# Patient Record
Sex: FEMALE | Race: WHITE | NOT HISPANIC OR LATINO | URBAN - METROPOLITAN AREA
[De-identification: names, ages, dates, MRNs, and addresses within clinical notes are randomized per-mention and may not be internally consistent; named-entity substitution may affect disease eponyms.]

---

## 2017-04-04 ENCOUNTER — OUTPATIENT (OUTPATIENT)
Dept: OUTPATIENT SERVICES | Facility: HOSPITAL | Age: 82
LOS: 1 days | End: 2017-04-04
Payer: MEDICARE

## 2017-04-04 VITALS
DIASTOLIC BLOOD PRESSURE: 70 MMHG | RESPIRATION RATE: 18 BRPM | WEIGHT: 134.48 LBS | OXYGEN SATURATION: 98 % | TEMPERATURE: 98 F | SYSTOLIC BLOOD PRESSURE: 172 MMHG | HEART RATE: 69 BPM | HEIGHT: 61 IN

## 2017-04-04 VITALS — DIASTOLIC BLOOD PRESSURE: 78 MMHG | SYSTOLIC BLOOD PRESSURE: 167 MMHG

## 2017-04-04 DIAGNOSIS — Z98.890 OTHER SPECIFIED POSTPROCEDURAL STATES: Chronic | ICD-10-CM

## 2017-04-04 DIAGNOSIS — Z98.42 CATARACT EXTRACTION STATUS, LEFT EYE: Chronic | ICD-10-CM

## 2017-04-04 DIAGNOSIS — Z90.710 ACQUIRED ABSENCE OF BOTH CERVIX AND UTERUS: Chronic | ICD-10-CM

## 2017-04-04 DIAGNOSIS — Z01.810 ENCOUNTER FOR PREPROCEDURAL CARDIOVASCULAR EXAMINATION: ICD-10-CM

## 2017-04-04 LAB
ANION GAP SERPL CALC-SCNC: 13 MMOL/L — SIGNIFICANT CHANGE UP (ref 5–17)
APTT BLD: 32.9 SEC — SIGNIFICANT CHANGE UP (ref 27.5–37.4)
BASOPHILS # BLD AUTO: 0 K/UL — SIGNIFICANT CHANGE UP (ref 0–0.2)
BASOPHILS NFR BLD AUTO: 0.3 % — SIGNIFICANT CHANGE UP (ref 0–2)
BUN SERPL-MCNC: 23 MG/DL — HIGH (ref 8–20)
CALCIUM SERPL-MCNC: 9.4 MG/DL — SIGNIFICANT CHANGE UP (ref 8.6–10.2)
CHLORIDE SERPL-SCNC: 102 MMOL/L — SIGNIFICANT CHANGE UP (ref 98–107)
CO2 SERPL-SCNC: 23 MMOL/L — SIGNIFICANT CHANGE UP (ref 22–29)
CREAT SERPL-MCNC: 0.94 MG/DL — SIGNIFICANT CHANGE UP (ref 0.5–1.3)
EOSINOPHIL # BLD AUTO: 0.3 K/UL — SIGNIFICANT CHANGE UP (ref 0–0.5)
EOSINOPHIL NFR BLD AUTO: 5.6 % — SIGNIFICANT CHANGE UP (ref 0–6)
GLUCOSE SERPL-MCNC: 101 MG/DL — SIGNIFICANT CHANGE UP (ref 70–115)
HCT VFR BLD CALC: 30.6 % — LOW (ref 37–47)
HGB BLD-MCNC: 9.7 G/DL — LOW (ref 12–16)
INR BLD: 2.21 RATIO — HIGH (ref 0.88–1.16)
LYMPHOCYTES # BLD AUTO: 1.8 K/UL — SIGNIFICANT CHANGE UP (ref 1–4.8)
LYMPHOCYTES # BLD AUTO: 29.3 % — SIGNIFICANT CHANGE UP (ref 20–55)
MAGNESIUM SERPL-MCNC: 2 MG/DL — SIGNIFICANT CHANGE UP (ref 1.8–2.5)
MCHC RBC-ENTMCNC: 28.1 PG — SIGNIFICANT CHANGE UP (ref 27–31)
MCHC RBC-ENTMCNC: 31.7 G/DL — LOW (ref 32–36)
MCV RBC AUTO: 88.7 FL — SIGNIFICANT CHANGE UP (ref 81–99)
MONOCYTES # BLD AUTO: 0.8 K/UL — SIGNIFICANT CHANGE UP (ref 0–0.8)
MONOCYTES NFR BLD AUTO: 13.2 % — HIGH (ref 3–10)
NEUTROPHILS # BLD AUTO: 3.1 K/UL — SIGNIFICANT CHANGE UP (ref 1.8–8)
NEUTROPHILS NFR BLD AUTO: 51.4 % — SIGNIFICANT CHANGE UP (ref 37–73)
PLATELET # BLD AUTO: 256 K/UL — SIGNIFICANT CHANGE UP (ref 150–400)
POTASSIUM SERPL-MCNC: 4.5 MMOL/L — SIGNIFICANT CHANGE UP (ref 3.5–5.3)
POTASSIUM SERPL-SCNC: 4.5 MMOL/L — SIGNIFICANT CHANGE UP (ref 3.5–5.3)
PROTHROM AB SERPL-ACNC: 24.7 SEC — HIGH (ref 9.8–12.7)
RBC # BLD: 3.45 M/UL — LOW (ref 4.4–5.2)
RBC # FLD: 14.3 % — SIGNIFICANT CHANGE UP (ref 11–15.6)
SODIUM SERPL-SCNC: 138 MMOL/L — SIGNIFICANT CHANGE UP (ref 135–145)
WBC # BLD: 6 K/UL — SIGNIFICANT CHANGE UP (ref 4.8–10.8)
WBC # FLD AUTO: 6 K/UL — SIGNIFICANT CHANGE UP (ref 4.8–10.8)

## 2017-04-04 PROCEDURE — 85027 COMPLETE CBC AUTOMATED: CPT

## 2017-04-04 PROCEDURE — 83735 ASSAY OF MAGNESIUM: CPT

## 2017-04-04 PROCEDURE — 93005 ELECTROCARDIOGRAM TRACING: CPT

## 2017-04-04 PROCEDURE — 85730 THROMBOPLASTIN TIME PARTIAL: CPT

## 2017-04-04 PROCEDURE — 85610 PROTHROMBIN TIME: CPT

## 2017-04-04 PROCEDURE — G0463: CPT

## 2017-04-04 PROCEDURE — 80048 BASIC METABOLIC PNL TOTAL CA: CPT

## 2017-04-04 PROCEDURE — 93010 ELECTROCARDIOGRAM REPORT: CPT

## 2017-04-04 NOTE — ASU PATIENT PROFILE, ADULT - PSH
H/O foot surgery    H/O: hysterectomy    History of carpal tunnel release    History of left cataract surgery

## 2017-04-04 NOTE — H&P PST ADULT - HISTORY OF PRESENT ILLNESS
89 y/o female with c/o worsening back discomfort radiating to her right arm accompanied by mild nausea, diaphoresis, dyspnea and pre-syncope. Symptoms have been occurring for the past few months and are usually relieved with rest after 2-3 ,minutes 89 y/o female with c/o worsening back discomfort radiating to her right arm accompanied by mild nausea, diaphoresis, dyspnea and pre-syncope. Symptoms have been occurring for the past few months and are usually relieved with rest after 2-3 minutes CCS 3. 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 She had a negative stress test 1 week ago as per Dr Ozuna's note (awaiting faxed report). She is referred for cardiac catheterization.

## 2017-04-04 NOTE — H&P PST ADULT - ASSESSMENT
87 y/o female with c/o worsening back discomfort radiating to her right arm accompanied by mild nausea, diaphoresis, dyspnea and pre-syncope. Symptoms have been occurring for the past few months and are usually relieved with rest after 2-3 minutes CCS 3. 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 She had a negative nuclear stress test 1 week ago as per Dr Ozuna's note (awaiting faxed report). She is referred for cardiac catheterization.      Plan:   - Elective LHC w/ Dr Billingsley on 4.7.17  - Pt instructed to hold warfarin for 3 days, repeat INR morning of procedure

## 2017-04-04 NOTE — H&P PST ADULT - PMH
Angina pectoris    ASHD (arteriosclerotic heart disease)    DVT (deep venous thrombosis)    HLD (hyperlipidemia)    HTN (hypertension)    Pulmonary embolus

## 2017-04-04 NOTE — ASU PATIENT PROFILE, ADULT - PMH
Angina pectoris    ASHD (arteriosclerotic heart disease)    HLD (hyperlipidemia)    HTN (hypertension)    Pulmonary embolus

## 2017-04-05 DIAGNOSIS — Z01.810 ENCOUNTER FOR PREPROCEDURAL CARDIOVASCULAR EXAMINATION: ICD-10-CM

## 2017-04-05 DIAGNOSIS — I25.10 ATHEROSCLEROTIC HEART DISEASE OF NATIVE CORONARY ARTERY WITHOUT ANGINA PECTORIS: ICD-10-CM

## 2017-04-07 ENCOUNTER — INPATIENT (INPATIENT)
Facility: HOSPITAL | Age: 82
LOS: 8 days | Discharge: HOME | DRG: 234 | End: 2017-04-16
Attending: THORACIC SURGERY (CARDIOTHORACIC VASCULAR SURGERY) | Admitting: INTERNAL MEDICINE
Payer: MEDICARE

## 2017-04-07 VITALS
DIASTOLIC BLOOD PRESSURE: 69 MMHG | OXYGEN SATURATION: 99 % | HEART RATE: 68 BPM | RESPIRATION RATE: 16 BRPM | SYSTOLIC BLOOD PRESSURE: 160 MMHG | TEMPERATURE: 98 F

## 2017-04-07 DIAGNOSIS — I25.10 ATHEROSCLEROTIC HEART DISEASE OF NATIVE CORONARY ARTERY WITHOUT ANGINA PECTORIS: ICD-10-CM

## 2017-04-07 DIAGNOSIS — I10 ESSENTIAL (PRIMARY) HYPERTENSION: ICD-10-CM

## 2017-04-07 DIAGNOSIS — R07.89 OTHER CHEST PAIN: ICD-10-CM

## 2017-04-07 DIAGNOSIS — Z90.710 ACQUIRED ABSENCE OF BOTH CERVIX AND UTERUS: Chronic | ICD-10-CM

## 2017-04-07 DIAGNOSIS — E78.00 PURE HYPERCHOLESTEROLEMIA, UNSPECIFIED: ICD-10-CM

## 2017-04-07 DIAGNOSIS — Z01.810 ENCOUNTER FOR PREPROCEDURAL CARDIOVASCULAR EXAMINATION: ICD-10-CM

## 2017-04-07 DIAGNOSIS — Z98.890 OTHER SPECIFIED POSTPROCEDURAL STATES: Chronic | ICD-10-CM

## 2017-04-07 DIAGNOSIS — I26.99 OTHER PULMONARY EMBOLISM WITHOUT ACUTE COR PULMONALE: ICD-10-CM

## 2017-04-07 DIAGNOSIS — Z98.42 CATARACT EXTRACTION STATUS, LEFT EYE: Chronic | ICD-10-CM

## 2017-04-07 LAB
ALBUMIN SERPL ELPH-MCNC: 3.9 G/DL — SIGNIFICANT CHANGE UP (ref 3.3–5.2)
ALP SERPL-CCNC: 64 U/L — SIGNIFICANT CHANGE UP (ref 40–120)
ALT FLD-CCNC: 14 U/L — SIGNIFICANT CHANGE UP
AMYLASE P1 CFR SERPL: 92 U/L — SIGNIFICANT CHANGE UP (ref 36–128)
ANION GAP SERPL CALC-SCNC: 16 MMOL/L — SIGNIFICANT CHANGE UP (ref 5–17)
APTT BLD: 26.4 SEC — LOW (ref 27.5–37.4)
AST SERPL-CCNC: 27 U/L — SIGNIFICANT CHANGE UP
BILIRUB SERPL-MCNC: 0.4 MG/DL — SIGNIFICANT CHANGE UP (ref 0.4–2)
BLD GP AB SCN SERPL QL: SIGNIFICANT CHANGE UP
BUN SERPL-MCNC: 18 MG/DL — SIGNIFICANT CHANGE UP (ref 8–20)
CALCIUM SERPL-MCNC: 9.7 MG/DL — SIGNIFICANT CHANGE UP (ref 8.6–10.2)
CHLORIDE SERPL-SCNC: 106 MMOL/L — SIGNIFICANT CHANGE UP (ref 98–107)
CO2 SERPL-SCNC: 21 MMOL/L — LOW (ref 22–29)
CREAT SERPL-MCNC: 0.85 MG/DL — SIGNIFICANT CHANGE UP (ref 0.5–1.3)
GLUCOSE SERPL-MCNC: 125 MG/DL — HIGH (ref 70–115)
HBA1C BLD-MCNC: 6 % — HIGH (ref 4–5.6)
HCT VFR BLD CALC: 31.4 % — LOW (ref 37–47)
HGB BLD-MCNC: 10 G/DL — LOW (ref 12–16)
INR BLD: 1.05 RATIO — SIGNIFICANT CHANGE UP (ref 0.88–1.16)
MAGNESIUM SERPL-MCNC: 1.9 MG/DL — SIGNIFICANT CHANGE UP (ref 1.8–2.5)
MCHC RBC-ENTMCNC: 27.9 PG — SIGNIFICANT CHANGE UP (ref 27–31)
MCHC RBC-ENTMCNC: 31.8 G/DL — LOW (ref 32–36)
MCV RBC AUTO: 87.7 FL — SIGNIFICANT CHANGE UP (ref 81–99)
NT-PROBNP SERPL-SCNC: 296 PG/ML — SIGNIFICANT CHANGE UP (ref 0–300)
PHOSPHATE SERPL-MCNC: 3.7 MG/DL — SIGNIFICANT CHANGE UP (ref 2.4–4.7)
PLATELET # BLD AUTO: 246 K/UL — SIGNIFICANT CHANGE UP (ref 150–400)
POTASSIUM SERPL-MCNC: 4.2 MMOL/L — SIGNIFICANT CHANGE UP (ref 3.5–5.3)
POTASSIUM SERPL-SCNC: 4.2 MMOL/L — SIGNIFICANT CHANGE UP (ref 3.5–5.3)
PREALB SERPL-MCNC: 27 MG/DL — SIGNIFICANT CHANGE UP (ref 18–38)
PROT SERPL-MCNC: 7.9 G/DL — SIGNIFICANT CHANGE UP (ref 6.6–8.7)
PROTHROM AB SERPL-ACNC: 11.6 SEC — SIGNIFICANT CHANGE UP (ref 9.8–12.7)
RBC # BLD: 3.58 M/UL — LOW (ref 4.4–5.2)
RBC # FLD: 14.3 % — SIGNIFICANT CHANGE UP (ref 11–15.6)
SODIUM SERPL-SCNC: 143 MMOL/L — SIGNIFICANT CHANGE UP (ref 135–145)
T3 SERPL-MCNC: 90 NG/DL — SIGNIFICANT CHANGE UP (ref 80–200)
T4 AB SER-ACNC: 7.2 UG/DL — SIGNIFICANT CHANGE UP (ref 4.5–12)
TSH SERPL-MCNC: 4.04 UIU/ML — SIGNIFICANT CHANGE UP (ref 0.27–4.2)
TYPE + AB SCN PNL BLD: SIGNIFICANT CHANGE UP
WBC # BLD: 6.1 K/UL — SIGNIFICANT CHANGE UP (ref 4.8–10.8)
WBC # FLD AUTO: 6.1 K/UL — SIGNIFICANT CHANGE UP (ref 4.8–10.8)

## 2017-04-07 PROCEDURE — 93454 CORONARY ARTERY ANGIO S&I: CPT | Mod: 26

## 2017-04-07 PROCEDURE — 93306 TTE W/DOPPLER COMPLETE: CPT | Mod: 26

## 2017-04-07 RX ORDER — HEPARIN SODIUM 5000 [USP'U]/ML
2500 INJECTION INTRAVENOUS; SUBCUTANEOUS EVERY 6 HOURS
Qty: 0 | Refills: 0 | Status: DISCONTINUED | OUTPATIENT
Start: 2017-04-07 | End: 2017-04-07

## 2017-04-07 RX ORDER — CHLORHEXIDINE GLUCONATE 213 G/1000ML
1 SOLUTION TOPICAL
Qty: 0 | Refills: 0 | Status: DISCONTINUED | OUTPATIENT
Start: 2017-04-08 | End: 2017-04-10

## 2017-04-07 RX ORDER — ATORVASTATIN CALCIUM 80 MG/1
10 TABLET, FILM COATED ORAL AT BEDTIME
Qty: 0 | Refills: 0 | Status: DISCONTINUED | OUTPATIENT
Start: 2017-04-07 | End: 2017-04-08

## 2017-04-07 RX ORDER — HEPARIN SODIUM 5000 [USP'U]/ML
1000 INJECTION INTRAVENOUS; SUBCUTANEOUS
Qty: 25000 | Refills: 0 | Status: DISCONTINUED | OUTPATIENT
Start: 2017-04-07 | End: 2017-04-10

## 2017-04-07 RX ORDER — MAGNESIUM OXIDE 400 MG ORAL TABLET 241.3 MG
400 TABLET ORAL ONCE
Qty: 0 | Refills: 0 | Status: COMPLETED | OUTPATIENT
Start: 2017-04-07 | End: 2017-04-07

## 2017-04-07 RX ORDER — FENTANYL CITRATE 50 UG/ML
25 INJECTION INTRAVENOUS ONCE
Qty: 0 | Refills: 0 | Status: DISCONTINUED | OUTPATIENT
Start: 2017-04-07 | End: 2017-04-07

## 2017-04-07 RX ORDER — CHLORHEXIDINE GLUCONATE 213 G/1000ML
15 SOLUTION TOPICAL
Qty: 0 | Refills: 0 | Status: DISCONTINUED | OUTPATIENT
Start: 2017-04-07 | End: 2017-04-10

## 2017-04-07 RX ORDER — HEPARIN SODIUM 5000 [USP'U]/ML
INJECTION INTRAVENOUS; SUBCUTANEOUS
Qty: 25000 | Refills: 0 | Status: DISCONTINUED | OUTPATIENT
Start: 2017-04-07 | End: 2017-04-07

## 2017-04-07 RX ORDER — MUPIROCIN 20 MG/G
1 OINTMENT TOPICAL
Qty: 0 | Refills: 0 | Status: DISCONTINUED | OUTPATIENT
Start: 2017-04-07 | End: 2017-04-10

## 2017-04-07 RX ORDER — INSULIN LISPRO 100/ML
VIAL (ML) SUBCUTANEOUS
Qty: 0 | Refills: 0 | Status: DISCONTINUED | OUTPATIENT
Start: 2017-04-07 | End: 2017-04-08

## 2017-04-07 RX ORDER — HYDRALAZINE HCL 50 MG
5 TABLET ORAL ONCE
Qty: 0 | Refills: 0 | Status: COMPLETED | OUTPATIENT
Start: 2017-04-07 | End: 2017-04-07

## 2017-04-07 RX ORDER — ASPIRIN/CALCIUM CARB/MAGNESIUM 324 MG
81 TABLET ORAL DAILY
Qty: 0 | Refills: 0 | Status: DISCONTINUED | OUTPATIENT
Start: 2017-04-08 | End: 2017-04-10

## 2017-04-07 RX ORDER — HEPARIN SODIUM 5000 [USP'U]/ML
5000 INJECTION INTRAVENOUS; SUBCUTANEOUS EVERY 6 HOURS
Qty: 0 | Refills: 0 | Status: DISCONTINUED | OUTPATIENT
Start: 2017-04-07 | End: 2017-04-07

## 2017-04-07 RX ORDER — METOPROLOL TARTRATE 50 MG
100 TABLET ORAL DAILY
Qty: 0 | Refills: 0 | Status: DISCONTINUED | OUTPATIENT
Start: 2017-04-07 | End: 2017-04-08

## 2017-04-07 RX ORDER — ASPIRIN/CALCIUM CARB/MAGNESIUM 324 MG
325 TABLET ORAL ONCE
Qty: 0 | Refills: 0 | Status: COMPLETED | OUTPATIENT
Start: 2017-04-07 | End: 2017-04-07

## 2017-04-07 RX ADMIN — FENTANYL CITRATE 25 MICROGRAM(S): 50 INJECTION INTRAVENOUS at 14:02

## 2017-04-07 RX ADMIN — MAGNESIUM OXIDE 400 MG ORAL TABLET 400 MILLIGRAM(S): 241.3 TABLET ORAL at 23:08

## 2017-04-07 RX ADMIN — MUPIROCIN 1 APPLICATION(S): 20 OINTMENT TOPICAL at 18:04

## 2017-04-07 RX ADMIN — HEPARIN SODIUM 10 UNIT(S)/HR: 5000 INJECTION INTRAVENOUS; SUBCUTANEOUS at 18:02

## 2017-04-07 RX ADMIN — ATORVASTATIN CALCIUM 10 MILLIGRAM(S): 80 TABLET, FILM COATED ORAL at 23:08

## 2017-04-07 RX ADMIN — Medication 5 MILLIGRAM(S): at 11:50

## 2017-04-07 RX ADMIN — CHLORHEXIDINE GLUCONATE 15 MILLILITER(S): 213 SOLUTION TOPICAL at 18:03

## 2017-04-07 RX ADMIN — Medication 325 MILLIGRAM(S): at 09:35

## 2017-04-07 RX ADMIN — FENTANYL CITRATE 25 MICROGRAM(S): 50 INJECTION INTRAVENOUS at 13:00

## 2017-04-07 NOTE — CONSULT NOTE ADULT - SUBJECTIVE AND OBJECTIVE BOX
CC: " i had this weird feeling in my back moving into my arm"     Surgeon: Dr. Griffin     Consult requesting by: Dr. Billingsley     HISTORY OF PRESENT ILLNESS:  88y Female presenting with complaints of a "weird feeling" up her back into her right arm associated with exertion.  Patient denies chest pain     PAST MEDICAL & SURGICAL HISTORY:  DVT (deep venous thrombosis)  Pulmonary embolus  ASHD (arteriosclerotic heart disease)  Angina pectoris  HLD (hyperlipidemia)  HTN (hypertension)  H/O foot surgery  H/O: hysterectomy  History of left cataract surgery  History of carpal tunnel release      MEDICATIONS  (STANDING):  atorvastatin 10milliGRAM(s) Oral at bedtime  metoprolol 100milliGRAM(s) Oral daily  chlorhexidine 0.12% Liquid 15milliLiter(s) Swish and Spit two times a day  mupirocin 2% Ointment 1Application(s) Topical two times a day  heparin  Infusion 1000Unit(s)/Hr IV Continuous <Continuous>  insulin lispro (HumaLOG) corrective regimen sliding scale  SubCutaneous Before meals and at bedtime    MEDICATIONS  (PRN):    Antiplatelet therapy:  Coumadin                 Last dose/amt: 5MG 4/3/2017    Allergies   No Known Allergies    Intolerances        SOCIAL HISTORY:  Smoker: [ ] Yes  [ ] No        PACK YEARS:                         WHEN QUIT?  ETOH use: [ ] Yes  [ ] No              FREQUENCY / QUANTITY:  Ilicit Drug use:  [ ] Yes  [ ] No  Occupation:  Live with:  Assisted device use:    FAMILY HISTORY:      Review of Systems  CONSTITUTIONAL:  Fevers[ ] chills[ ] sweats[ ] fatigue[ ] weight loss[ ] weight gain [ ]                                     NEGATIVE [ ]   NEURO:  parathesias[ ] seizures [ ]  syncope [ ]  confusion [ ]                                                                                NEGATIVE[ ]   EYES: glasses[ ]  blurry vision[ ]  discharge[ ] pain[ ] glaucoma [ ]                                                                          NEGATIVE[ ]   ENMT:  difficulty hearing [ ]  vertigo[ ]  dysphagia[ ] epistaxis[ ] recent dental work [ ]                                    NEGATIVE[ ]   CV:  chest pain[ ] palpitations[ ] LAURENT [ ] diaphoresis [ ]                                                                                           NEGATIVE[ ]   RESPIRATORY:  wheezing[ ] SOB[ ] cough [ ] sputum[ ] hemoptysis[ ]                                                                  NEGATIVE[ ]   GI:  nausea[ ]  vommiting [ ]  diarrhea[ ] constipation [ ] melena [ ]                                                                      NEGATIVE[ ]   : hematuria[ ]  dysuria[ ] urgency[ ] incontinence[ ]                                                                                            NEGATIVE[ ]   MUSKULOSKELETAL:  arthritis[ ]  joint swelling [ ] muscle weakness [ ]                                                                NEGATIVE[ ]   SKIN/BREAST:  rash[ ] itching [ ]  hair loss[ ] masses[ ]                                                                                              NEGATIVE[ ]   PSYCH:  dementia [ ] depresion [ ] anxiety[ ]                                                                                                               NEGATIVE[ ]   HEME/LYMPH:  bruises easily[ ] enlarged lymph nodes[ ] tender lymph nodes[ ]                                               NEGATIVE[ ]   ENDOCRINE:  cold intolerance[ ] heat intolerance[ ] polydipsia[ ]                                                                          NEGATIVE[ ]     PHYSICAL EXAM  Vital Signs Last 24 Hrs  T(C): 36.6, Max: 36.6 (04-07 @ 10:06)  T(F): 97.9, Max: 97.9 (04-07 @ 10:06)  HR: 70 (43 - 78)  BP: 138/68 (138/68 - 170/68)  BP(mean): --  RR: 16 (16 - 16)  SpO2: 99% (96% - 100%)    CONSTITUTIONAL:                                                                          WNL[ ]   Neuro: WNL[ ] Normal exam oriented to person/place & time with no focal motor or sensory  deficits. Other __________                    Eyes: WNL[ ]   Normal exam of conjunctiva & lids, pupils equally reactive. Other______________________________     ENT: WNL[ ]    Normal exam of nasal/oral mucosa with absence of cyanosis. Other_____________________________  Neck: WNL[ ]  Normal exam of jugular veins, trachea & thyroid. Other_________________________________________  Chest: WNL[ ] Normal lung exam with good air movement absence of wheezes, rales, or rhonchi: Other_________________________________________                                                                                CV:  Auscultation: normal [ ] S3[ ] S4[ ] Irregular [ ] Rub[ ] Clicks[ ]    Murmurs none:[ ]systolic [ ]  diastolic [ ] holosystolic [ ]  Carotids: No Bruits[ ] Other____________ Abdominal Aorta: normal [ ] nonpalpable[ ]Other___________                                                                                      GI: WNL[ ] Normal exam of abdomen, liver & spleen with no noted masses or tenderness. Other______________________                                                                                                        Extremities: WNL[ ] Normal no evidence of cyanosis or deformity Edema: none[ ]trace[ ]1+[ ]2+[ ]3+[ ]4+[ ]  Lower Extremity Pulses: Right[ ] Left[ ]Varicosities[ ]  SKIN :WNL[ ] Normal exam to inspection & palation. Other:____________________                                                          LABS:          PT/INR - ( 07 Apr 2017 09:40 )   PT: 11.6 sec;   INR: 1.05 ratio                         Cardiac Cath:    TTE / MERARY:      Assessment:          Plan: CC: " i had this weird feeling in my back moving into my arm"     Surgeon: Dr. Griffin     Consult requesting by: Dr. Billingsley     HISTORY OF PRESENT ILLNESS:  88y Female presenting with complaints of a "weird feeling" up her back into her right arm associated with exertion, mild nausea, dyspnea, and pre syncopable symptoms.  Patient denies chest pain during episode and at this time.      PAST MEDICAL & SURGICAL HISTORY:  DVT (deep venous thrombosis)  Pulmonary embolus  ASHD (arteriosclerotic heart disease)  Angina pectoris  HLD (hyperlipidemia)  HTN (hypertension)  H/O foot surgery  H/O: hysterectomy  History of left cataract surgery  History of carpal tunnel release      MEDICATIONS  (STANDING):  atorvastatin 10milliGRAM(s) Oral at bedtime  metoprolol 100milliGRAM(s) Oral daily  chlorhexidine 0.12% Liquid 15milliLiter(s) Swish and Spit two times a day  mupirocin 2% Ointment 1Application(s) Topical two times a day  heparin  Infusion 1000Unit(s)/Hr IV Continuous <Continuous>  insulin lispro (HumaLOG) corrective regimen sliding scale  SubCutaneous Before meals and at bedtime    MEDICATIONS  (PRN):    Antiplatelet therapy:  Coumadin                 Last dose/amt: 5MG 4/3/2017    Allergies   No Known Allergies    Intolerances        SOCIAL HISTORY:  Smoker: NO       PACK YEARS:                         WHEN QUIT?  ETOH use: NO          FREQUENCY / QUANTITY:  Ilicit Drug use:  NO  Occupation: retired   Live with: alone, family lives close by   Assisted device use: None     FAMILY HISTORY:      Review of Systems  CONSTITUTIONAL:  Fevers[ ] chills[ ] sweats[ ] fatigue[ ] weight loss[ ] weight gain [ ]                                     NEGATIVE [ ]   NEURO:  parathesias[ ] seizures [ ]  syncope [ ]  confusion [ ]                                                                                NEGATIVE[ ]   EYES: glasses[ ]  blurry vision[ ]  discharge[ ] pain[ ] glaucoma [ ]                                                                          NEGATIVE[ ]   ENMT:  difficulty hearing [ ]  vertigo[ ]  dysphagia[ ] epistaxis[ ] recent dental work [ ]                                    NEGATIVE[ ]   CV:  chest pain[ ] palpitations[ ] LAURENT [yes ] diaphoresis [ yes]                                                                                           NEGATIVE[ ]   RESPIRATORY:  wheezing[ ] SOB[yes ] cough [ ] sputum[ ] hemoptysis[ ]                                                                  NEGATIVE[ ]   GI:  nausea[yes ]  vommiting [ ]  diarrhea[ ] constipation [ ] melena [ ]                                                                      NEGATIVE[ ]   : hematuria[ ]  dysuria[ ] urgency[ ] incontinence[ ]                                                                                            NEGATIVE[ ]   MUSKULOSKELETAL:  arthritis[yes ]  joint swelling [ ] muscle weakness [ ]                                                                NEGATIVE[ ]   SKIN/BREAST:  rash[ ] itching [ ]  hair loss[ ] masses[ ]                                                                                              NEGATIVE[ ]   PSYCH:  dementia [ ] depresion [ ] anxiety[ ]                                                                                                               NEGATIVE[ ]   HEME/LYMPH:  bruises easily[ ] enlarged lymph nodes[ ] tender lymph nodes[ ]                                               NEGATIVE[ ]   ENDOCRINE:  cold intolerance[ ] heat intolerance[ ] polydipsia[ ]                                                                          NEGATIVE[ ]     PHYSICAL EXAM  Vital Signs Last 24 Hrs  T(C): 36.6, Max: 36.6 (04-07 @ 10:06)  T(F): 97.9, Max: 97.9 (04-07 @ 10:06)  HR: 70 (43 - 78)  BP: 138/68 (138/68 - 170/68)  BP(mean): --  RR: 16 (16 - 16)  SpO2: 99% (96% - 100%)    CONSTITUTIONAL:                                                                           Neuro: alert and oriented x 3, without focal defects          Eyes: PERRLA  ENT: neck supple, without JVD   Chest: +S1S2 RRR without murmur                                                                   Carotids: No Bruits                                                                                  GI: soft nontender wihtout guarding                                                                                                   Extremities: +pulses, wihtout edema   SKIN :Normal exam to inspection & palation.                                                           LABS:          PT/INR - ( 07 Apr 2017 09:40 )   PT: 11.6 sec;   INR: 1.05 ratio      Cardiac Cath:  DIAGNOSTIC IMPRESSIONS: Severe LM 70% and multivessel disease including  proximal LAD 75% stenosis and proximal CX 95% stenosis  DIAGNOSTIC RECOMMENDATIONS: Evaulation for CABG to LAD, diagonal , OM,  LPL/PDA    TTE / MERARY:  PENDING

## 2017-04-07 NOTE — CONSULT NOTE ADULT - ASSESSMENT
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

## 2017-04-07 NOTE — PROGRESS NOTE ADULT - SUBJECTIVE AND OBJECTIVE BOX
Cardiology NP    88 year old female with c/o chest/back discomfort.  Normal stress test. For cardiac cath    No change in medical history since PST.

## 2017-04-07 NOTE — PROGRESS NOTE ADULT - SUBJECTIVE AND OBJECTIVE BOX
Nurse Practitioner Progress note:     INTERVAL HISTORY: 88 year old female with c/o chest/back pain.  Pt was symptomatic prior to cath.      TELEMETRY: NSR    T(C): 36.6, Max: 36.6 (04-07 @ 10:06)  HR: 66 (43 - 74)  BP: 164/69 (158/74 - 170/68)  RR: 16 (16 - 16)  SpO2: 98% (96% - 100%)      PHYSICAL EXAM:  Appearance: Normal	  Cardiovascular: Normal S1 S2, No JVD, No murmurs, No edema  Respiratory: Lungs clear to auscultation	  Neurologic: Non-focal, A&O X3.  No neuro deficits  Procedure Site: R groin sheath removed.  Manual compression held for 15 minutes to obtain/maintain hemostasis.  Site benign. No bleeding/hematoma/ecchymosis.  + palp pedal pulse      PROCEDURE RESULTS: S/P LHC which revealed TVD: LM 70%, LAD 75%, Cx 95%.  Referred for CABG    ASSESSMENT/PLAN: 	  -Transfer to CTICU  -Groin precautions  -Bedrest   -Heparin gtt to be initiated at 1800  -B/P control  -Pre op work up  -Please call with questions/concerns

## 2017-04-07 NOTE — PROGRESS NOTE ADULT - SUBJECTIVE AND OBJECTIVE BOX
Cardiology NP    Prior to cardiac cath pt ambulated to BR.  Upon return pt diaphoretic with c/o  R arm pain and HR of 43bpm, SOB and  B/P 170/68.  Symptoms lasted approx 1 minute.  Taken emergently to cath lab.

## 2017-04-08 DIAGNOSIS — E78.5 HYPERLIPIDEMIA, UNSPECIFIED: ICD-10-CM

## 2017-04-08 DIAGNOSIS — I27.82 CHRONIC PULMONARY EMBOLISM: ICD-10-CM

## 2017-04-08 LAB
ABO RH CONFIRMATION: SIGNIFICANT CHANGE UP
ANION GAP SERPL CALC-SCNC: 13 MMOL/L — SIGNIFICANT CHANGE UP (ref 5–17)
APPEARANCE UR: CLEAR — SIGNIFICANT CHANGE UP
APTT BLD: 119.7 SEC — HIGH (ref 27.5–37.4)
APTT BLD: 127.1 SEC — CRITICAL HIGH (ref 27.5–37.4)
APTT BLD: 68.1 SEC — HIGH (ref 27.5–37.4)
APTT BLD: 99.2 SEC — HIGH (ref 27.5–37.4)
BILIRUB UR-MCNC: NEGATIVE — SIGNIFICANT CHANGE UP
BUN SERPL-MCNC: 18 MG/DL — SIGNIFICANT CHANGE UP (ref 8–20)
CALCIUM SERPL-MCNC: 9.6 MG/DL — SIGNIFICANT CHANGE UP (ref 8.6–10.2)
CHLORIDE SERPL-SCNC: 105 MMOL/L — SIGNIFICANT CHANGE UP (ref 98–107)
CO2 SERPL-SCNC: 23 MMOL/L — SIGNIFICANT CHANGE UP (ref 22–29)
COLOR SPEC: YELLOW — SIGNIFICANT CHANGE UP
CREAT SERPL-MCNC: 0.96 MG/DL — SIGNIFICANT CHANGE UP (ref 0.5–1.3)
DIFF PNL FLD: NEGATIVE — SIGNIFICANT CHANGE UP
GLUCOSE SERPL-MCNC: 112 MG/DL — SIGNIFICANT CHANGE UP (ref 70–115)
GLUCOSE UR QL: NEGATIVE MG/DL — SIGNIFICANT CHANGE UP
HCT VFR BLD CALC: 30.4 % — LOW (ref 37–47)
HGB BLD-MCNC: 9.6 G/DL — LOW (ref 12–16)
INR BLD: 1.06 RATIO — SIGNIFICANT CHANGE UP (ref 0.88–1.16)
KETONES UR-MCNC: NEGATIVE — SIGNIFICANT CHANGE UP
LEUKOCYTE ESTERASE UR-ACNC: NEGATIVE — SIGNIFICANT CHANGE UP
MAGNESIUM SERPL-MCNC: 1.9 MG/DL — SIGNIFICANT CHANGE UP (ref 1.8–2.5)
MCHC RBC-ENTMCNC: 27.4 PG — SIGNIFICANT CHANGE UP (ref 27–31)
MCHC RBC-ENTMCNC: 31.6 G/DL — LOW (ref 32–36)
MCV RBC AUTO: 86.6 FL — SIGNIFICANT CHANGE UP (ref 81–99)
MRSA PCR RESULT.: SIGNIFICANT CHANGE UP
NITRITE UR-MCNC: NEGATIVE — SIGNIFICANT CHANGE UP
PH UR: 6 — SIGNIFICANT CHANGE UP (ref 4.8–8)
PHOSPHATE SERPL-MCNC: 3.7 MG/DL — SIGNIFICANT CHANGE UP (ref 2.4–4.7)
PLATELET # BLD AUTO: 243 K/UL — SIGNIFICANT CHANGE UP (ref 150–400)
POTASSIUM SERPL-MCNC: 4.3 MMOL/L — SIGNIFICANT CHANGE UP (ref 3.5–5.3)
POTASSIUM SERPL-SCNC: 4.3 MMOL/L — SIGNIFICANT CHANGE UP (ref 3.5–5.3)
PROT UR-MCNC: NEGATIVE MG/DL — SIGNIFICANT CHANGE UP
PROTHROM AB SERPL-ACNC: 11.7 SEC — SIGNIFICANT CHANGE UP (ref 9.8–12.7)
RBC # BLD: 3.51 M/UL — LOW (ref 4.4–5.2)
RBC # FLD: 14.4 % — SIGNIFICANT CHANGE UP (ref 11–15.6)
S AUREUS DNA NOSE QL NAA+PROBE: SIGNIFICANT CHANGE UP
SODIUM SERPL-SCNC: 141 MMOL/L — SIGNIFICANT CHANGE UP (ref 135–145)
SP GR SPEC: 1.01 — SIGNIFICANT CHANGE UP (ref 1.01–1.02)
UROBILINOGEN FLD QL: NEGATIVE MG/DL — SIGNIFICANT CHANGE UP
WBC # BLD: 5.4 K/UL — SIGNIFICANT CHANGE UP (ref 4.8–10.8)
WBC # FLD AUTO: 5.4 K/UL — SIGNIFICANT CHANGE UP (ref 4.8–10.8)

## 2017-04-08 PROCEDURE — 93880 EXTRACRANIAL BILAT STUDY: CPT | Mod: 26

## 2017-04-08 PROCEDURE — 71010: CPT | Mod: 26

## 2017-04-08 RX ORDER — METOPROLOL TARTRATE 50 MG
100 TABLET ORAL DAILY
Qty: 0 | Refills: 0 | Status: DISCONTINUED | OUTPATIENT
Start: 2017-04-09 | End: 2017-04-10

## 2017-04-08 RX ORDER — MAGNESIUM SULFATE 500 MG/ML
2 VIAL (ML) INJECTION ONCE
Qty: 0 | Refills: 0 | Status: DISCONTINUED | OUTPATIENT
Start: 2017-04-08 | End: 2017-04-08

## 2017-04-08 RX ORDER — MAGNESIUM OXIDE 400 MG ORAL TABLET 241.3 MG
400 TABLET ORAL ONCE
Qty: 0 | Refills: 0 | Status: COMPLETED | OUTPATIENT
Start: 2017-04-08 | End: 2017-04-08

## 2017-04-08 RX ORDER — ATORVASTATIN CALCIUM 80 MG/1
40 TABLET, FILM COATED ORAL AT BEDTIME
Qty: 0 | Refills: 0 | Status: DISCONTINUED | OUTPATIENT
Start: 2017-04-08 | End: 2017-04-10

## 2017-04-08 RX ORDER — AMLODIPINE BESYLATE 2.5 MG/1
2.5 TABLET ORAL DAILY
Qty: 0 | Refills: 0 | Status: DISCONTINUED | OUTPATIENT
Start: 2017-04-08 | End: 2017-04-08

## 2017-04-08 RX ADMIN — Medication 81 MILLIGRAM(S): at 15:11

## 2017-04-08 RX ADMIN — HEPARIN SODIUM 8 UNIT(S)/HR: 5000 INJECTION INTRAVENOUS; SUBCUTANEOUS at 09:03

## 2017-04-08 RX ADMIN — CHLORHEXIDINE GLUCONATE 1 APPLICATION(S): 213 SOLUTION TOPICAL at 21:40

## 2017-04-08 RX ADMIN — CHLORHEXIDINE GLUCONATE 15 MILLILITER(S): 213 SOLUTION TOPICAL at 05:57

## 2017-04-08 RX ADMIN — MUPIROCIN 1 APPLICATION(S): 20 OINTMENT TOPICAL at 21:39

## 2017-04-08 RX ADMIN — AMLODIPINE BESYLATE 2.5 MILLIGRAM(S): 2.5 TABLET ORAL at 06:48

## 2017-04-08 RX ADMIN — MUPIROCIN 1 APPLICATION(S): 20 OINTMENT TOPICAL at 05:57

## 2017-04-08 RX ADMIN — MAGNESIUM OXIDE 400 MG ORAL TABLET 400 MILLIGRAM(S): 241.3 TABLET ORAL at 09:13

## 2017-04-08 RX ADMIN — Medication 100 MILLIGRAM(S): at 05:57

## 2017-04-08 RX ADMIN — HEPARIN SODIUM 6 UNIT(S)/HR: 5000 INJECTION INTRAVENOUS; SUBCUTANEOUS at 16:39

## 2017-04-08 RX ADMIN — HEPARIN SODIUM 9 UNIT(S)/HR: 5000 INJECTION INTRAVENOUS; SUBCUTANEOUS at 07:18

## 2017-04-08 RX ADMIN — CHLORHEXIDINE GLUCONATE 15 MILLILITER(S): 213 SOLUTION TOPICAL at 21:39

## 2017-04-08 NOTE — PROGRESS NOTE ADULT - SUBJECTIVE AND OBJECTIVE BOX
Subjective:  88yFemale  PMHX HTN, HLD, h/o dvt and pe on coumadin, now found to have 3vd.  NO acute complaints    Past Medical History:  Pre-operative cardiovascular examination  Other chest pain  DVT (deep venous thrombosis)  Pulmonary embolus  ASHD (arteriosclerotic heart disease)  Angina pectoris  HLD (hyperlipidemia)  HTN (hypertension)  Other pulmonary embolism without acute cor pulmonale, unspecified chronicity  Essential hypertension  Pure hypercholesterolemia  ASHD (arteriosclerotic heart disease)  H/O foot surgery  H/O: hysterectomy  History of left cataract surgery  History of carpal tunnel release        atorvastatin 10milliGRAM(s) Oral at bedtime  metoprolol 100milliGRAM(s) Oral daily  chlorhexidine 0.12% Liquid 15milliLiter(s) Swish and Spit two times a day  chlorhexidine 4% Liquid 1Application(s) Topical two times a day  mupirocin 2% Ointment 1Application(s) Topical two times a day  heparin  Infusion 1000Unit(s)/Hr IV Continuous <Continuous>  insulin lispro (HumaLOG) corrective regimen sliding scale  SubCutaneous Before meals and at bedtime  aspirin enteric coated 81milliGRAM(s) Oral daily  MEDICATIONS  (PRN):    Height (cm): 152.4 (04-07 @ 09:25)  Weight (kg): 60.8 (04-07 @ 09:25)  BMI (kg/m2): 26.2 (04-07 @ 09:25)  BSA (m2): 1.57 (04-07 @ 09:25)  Daily     Daily                               10.0   6.1   )-----------( 246      ( 07 Apr 2017 18:33 )             31.4   04-07    143  |  106  |  18.0  ----------------------------<  125<H>  4.2   |  21.0<L>  |  0.85    Ca    9.7      07 Apr 2017 18:33  Phos  3.7     04-07  Mg     1.9     04-07    TPro  7.9  /  Alb  3.9  /  TBili  0.4  /  DBili  x   /  AST  27  /  ALT  14  /  AlkPhos  64  04-07      PT/INR - ( 07 Apr 2017 09:40 )   PT: 11.6 sec;   INR: 1.05 ratio         PTT - ( 08 Apr 2017 00:21 )  PTT:99.2 sec      Objective:  T(C): 36.9, Max: 36.9 (04-07 @ 17:00)  HR: 75 (43 - 84)  BP: 131/59 (111/54 - 170/68)  RR: 15 (15 - 35)  SpO2: 98% (96% - 100%)  Wt(kg): --CAPILLARY BLOOD GLUCOSE  I&O's Summary    I & Os for current day (as of 08 Apr 2017 00:52)  =============================================  IN: 160 ml / OUT: 600 ml / NET: -440 ml      Physical Exam:  Neuro: a0x3  no focal  Pulm: cta b/l  CV: s1/s2  Abd:  soft nt/nd

## 2017-04-08 NOTE — PROGRESS NOTE ADULT - SUBJECTIVE AND OBJECTIVE BOX
88yFemale S/P        LM: 70% distal stenosis       LAD: 75% proximal stenosis       LCX: 95% proximal stenosis       RCA: Small nondominant    Subjective: The patient denies chest pain, SOB, or palpitations.    Objective:  T(C): 36.6, Max: 36.9 (04-07 @ 17:00)  HR: 70 (67 - 86)  BP: 173/70 (111/54 - 173/70)  RR: 18 (15 - 35)  SpO2: 99% (96% - 100%)  Gen: Awake, alert, no acute distress  Right Groin: Soft, no bleeding or hematoma  Ext: + distal pulses, no edema    Labs:                         9.6    5.4   )-----------( 243      ( 08 Apr 2017 06:57 )             30.4     04-08    141  |  105  |  18.0  ----------------------------<  112  4.3   |  23.0  |  0.96    Ca    9.6      08 Apr 2017 06:57  Phos  3.7     04-08  Mg     1.9     04-08    TPro  7.9  /  Alb  3.9  /  TBili  0.4  /  DBili  x   /  AST  27  /  ALT  14  /  AlkPhos  64  04-07    PT/INR - ( 07 Apr 2017 09:40 )   PT: 11.6 sec;   INR: 1.05 ratio         PTT - ( 08 Apr 2017 06:57 )  PTT:119.7 sec

## 2017-04-09 LAB
APTT BLD: 38.3 SEC — HIGH (ref 27.5–37.4)
APTT BLD: 60.2 SEC — HIGH (ref 27.5–37.4)
APTT BLD: 64.5 SEC — HIGH (ref 27.5–37.4)

## 2017-04-09 RX ORDER — VANCOMYCIN HCL 1 G
750 VIAL (EA) INTRAVENOUS ONCE
Qty: 0 | Refills: 0 | Status: COMPLETED | OUTPATIENT
Start: 2017-04-09 | End: 2017-04-09

## 2017-04-09 RX ORDER — CEFUROXIME AXETIL 250 MG
1500 TABLET ORAL EVERY 8 HOURS
Qty: 0 | Refills: 0 | Status: DISCONTINUED | OUTPATIENT
Start: 2017-04-09 | End: 2017-04-09

## 2017-04-09 RX ORDER — CEFUROXIME AXETIL 250 MG
1500 TABLET ORAL ONCE
Qty: 0 | Refills: 0 | Status: DISCONTINUED | OUTPATIENT
Start: 2017-04-09 | End: 2017-04-10

## 2017-04-09 RX ORDER — ALPRAZOLAM 0.25 MG
0.25 TABLET ORAL ONCE
Qty: 0 | Refills: 0 | Status: DISCONTINUED | OUTPATIENT
Start: 2017-04-09 | End: 2017-04-10

## 2017-04-09 RX ADMIN — MUPIROCIN 1 APPLICATION(S): 20 OINTMENT TOPICAL at 06:33

## 2017-04-09 RX ADMIN — CHLORHEXIDINE GLUCONATE 15 MILLILITER(S): 213 SOLUTION TOPICAL at 06:32

## 2017-04-09 RX ADMIN — HEPARIN SODIUM 7 UNIT(S)/HR: 5000 INJECTION INTRAVENOUS; SUBCUTANEOUS at 11:53

## 2017-04-09 RX ADMIN — Medication 81 MILLIGRAM(S): at 10:44

## 2017-04-09 RX ADMIN — MUPIROCIN 1 APPLICATION(S): 20 OINTMENT TOPICAL at 18:48

## 2017-04-09 RX ADMIN — Medication 150 MILLIGRAM(S): at 09:13

## 2017-04-09 RX ADMIN — CHLORHEXIDINE GLUCONATE 1 APPLICATION(S): 213 SOLUTION TOPICAL at 18:45

## 2017-04-09 RX ADMIN — CHLORHEXIDINE GLUCONATE 1 APPLICATION(S): 213 SOLUTION TOPICAL at 06:32

## 2017-04-09 RX ADMIN — Medication 100 MILLIGRAM(S): at 10:43

## 2017-04-09 RX ADMIN — ATORVASTATIN CALCIUM 40 MILLIGRAM(S): 80 TABLET, FILM COATED ORAL at 05:08

## 2017-04-09 RX ADMIN — ATORVASTATIN CALCIUM 40 MILLIGRAM(S): 80 TABLET, FILM COATED ORAL at 21:43

## 2017-04-09 RX ADMIN — CHLORHEXIDINE GLUCONATE 15 MILLILITER(S): 213 SOLUTION TOPICAL at 18:48

## 2017-04-09 NOTE — PROGRESS NOTE ADULT - SUBJECTIVE AND OBJECTIVE BOX
Cardiac Surgery Pre-op Note:                                                                                                              Surgeon: Dr. Griffin     Procedure: Cabg x 3     Allergies    No Known Allergies    Intolerances        HPI: 88y w/ hx HTN, HLD, CAD, PE & DVT Female presenting with complaints of a "weird feeling" up her back into her right arm associated with exertion, mild nausea, dyspnea, and pre syncopal symptoms found to have triple vessel disease      PAST MEDICAL & SURGICAL HISTORY:  DVT (deep venous thrombosis)  Pulmonary embolus  ASHD (arteriosclerotic heart disease)  Angina pectoris  HLD (hyperlipidemia)  HTN (hypertension)  H/O foot surgery  H/O: hysterectomy  History of left cataract surgery  History of carpal tunnel release      MEDICATIONS  (STANDING):  chlorhexidine 0.12% Liquid 15milliLiter(s) Swish and Spit two times a day  chlorhexidine 4% Liquid 1Application(s) Topical two times a day  mupirocin 2% Ointment 1Application(s) Topical two times a day  heparin  Infusion 1000Unit(s)/Hr IV Continuous <Continuous>  aspirin enteric coated 81milliGRAM(s) Oral daily  metoprolol succinate ER 100milliGRAM(s) Oral daily  atorvastatin 40milliGRAM(s) Oral at bedtime  cefuroxime  IVPB 1500milliGRAM(s) IV Intermittent once    MEDICATIONS  (PRN):  ALPRAZolam 0.25milliGRAM(s) Oral once PRN anxiety        Labs:                        9.6    5.4   )-----------( 243      ( 2017 06:57 )             30.4     04-08    141  |  105  |  18.0  ----------------------------<  112  4.3   |  23.0  |  0.96    Ca    9.6      2017 06:57  Phos  3.7     04-08  Mg     1.9     04-08      PT/INR - ( 2017 21:30 )   PT: 11.7 sec;   INR: 1.06 ratio         PTT - ( 2017 10:19 )  PTT:38.3 sec    Urinalysis Basic - ( 2017 07:42 )    Color: Yellow / Appearance: Clear / S.015 / pH: x  Gluc: x / Ketone: Negative  / Bili: Negative / Urobili: Negative mg/dL   Blood: x / Protein: Negative mg/dL / Nitrite: Negative   Leuk Esterase: Negative / RBC: x / WBC x   Sq Epi: x / Non Sq Epi: x / Bacteria: x       MSSA PCR Result.: NotDetec ( @ 00:20)  MRSA PCR Result.: NotDetec ( @ 00:20)        MSSA PCR Result.: NotDetec ( @ 00:20)  MRSA PCR Result.: NotDetec ( @ 00:20)    CXR:  Single frontal view of the chest demonstrates the lungs to be clear. The   cardiomediastinal silhouette is normal. No acute osseous abnormalities.   Overlying EKG leads and wires are noted.    IMPRESSION: No acute cardiopulmonary disease process.    EKG:  Ventricular Rate 69 BPM  Atrial Rate 69 BPM  P-R Interval 140 ms  QRS Duration 78 ms  Q-T Interval 386 ms  QTC Calculation(Bezet) 413 ms  P Axis 66 degrees  R Axis -2 degrees  T Axis 7 degrees  Diagnosis Line Normal sinus rhythm  Nonspecific ST abnormality    Carotid Duplex:  Impression: Bilateral carotid plaque. No hemodynamically significant   stenosis bilaterally.    PFT's: pending     Echo:   Summary:   1. Mildly enlarged left atrium.   2. There is mild concentric left ventricular hypertrophy.   3. Normal wall motion. Left ventricular ejection fraction, by visual   estimation, is 60 to 65%. Grade I diastolic dysfunction.   4. The right atrium is normal in size.   5. Normal right ventricular size and function.   6. Mild mitral valve regurgitation.   7. Mild aortic regurgitation.   8. There is no evidence ofpericardial effusion.    Cath report:  CORONARY VESSELS: The coronary circulation is right dominant.  LM:   --  LM: The vessel was large sized and heavily calcified. There was a  tubular 70 % stenosis in the distal third of the vessel segment. The  lesion was irregularly contoured, eccentric, and heavily calcified.  LAD:   --  LAD: The vessel was large sized. There was a tubular 75 %  stenosis in the proximal third of the vessel segment. The lesion was  irregularly contoured, eccentric, and moderately calcified.  CX:   --  Circumflex: The vessel was large sized (dominant). There was a  tubular 95 % stenosis in the proximal third of the vessel segment. The  lesion was hazy, eccentric, and moderately calcified.  --  OM1: The vessel was medium sized. Angiography showed no evidence of  disease.  --  LPL1: The vessel was large sized. Angiographyshowed mild  atherosclerosis with no flow limiting lesions.  --  LPDA: The vessel was small sized.  RCA:   --  RCA: The vessel was small (non-dominant).  COMPLICATIONS: No complications occurred during the cath lab visit.  DIAGNOSTIC IMPRESSIONS: Severe LM 70% and multivessel disease including  proximal LAD 75% stenosis and proximal CX 95% stenosis  DIAGNOSTIC RECOMMENDATIONS: Evaulation for CABG to LAD, diagonal , OM,  LPL/PDA          Pt has AICD/PPm NO             Brand Name:  Pre-op Beta Blocker ordered within 24 hrs of surgery?  YES  Pre-op Hibiclens & Peridex (BID x 2 days preferred) YES  Type & Cross  YES  NPO after Midnight YES  Pre-op ABX ordered, to be taped on chart:  YES  Consent obtained  Pending

## 2017-04-09 NOTE — PROGRESS NOTE ADULT - ASSESSMENT
88yFemale  PMHX HTN, HLD, h/o dvt and pe on coumadin, now found to have 3vd.  NO acute complaints.  OR on 4/10 with Dr. Griffin

## 2017-04-09 NOTE — PROGRESS NOTE ADULT - SUBJECTIVE AND OBJECTIVE BOX
Subjective:  88yFemale  preop cabg on 4/10  no overnight complaints    Past Medical History:  Pre-operative cardiovascular examination  Other chest pain  Handoff  DVT (deep venous thrombosis)  Pulmonary embolus  ASHD (arteriosclerotic heart disease)  Angina pectoris  HLD (hyperlipidemia)  HTN (hypertension)  Other chronic pulmonary embolism without acute cor pulmonale  Hyperlipidemia, unspecified hyperlipidemia type  Other pulmonary embolism without acute cor pulmonale, unspecified chronicity  Essential hypertension  Pure hypercholesterolemia  ASHD (arteriosclerotic heart disease)  H/O foot surgery  H/O: hysterectomy  History of left cataract surgery  History of carpal tunnel release        chlorhexidine 0.12% Liquid 15milliLiter(s) Swish and Spit two times a day  chlorhexidine 4% Liquid 1Application(s) Topical two times a day  mupirocin 2% Ointment 1Application(s) Topical two times a day  heparin  Infusion 1000Unit(s)/Hr IV Continuous <Continuous>  aspirin enteric coated 81milliGRAM(s) Oral daily  metoprolol succinate ER 100milliGRAM(s) Oral daily  atorvastatin 40milliGRAM(s) Oral at bedtime  MEDICATIONS  (PRN):      Daily     Daily                               9.6    5.4   )-----------( 243      ( 08 Apr 2017 06:57 )             30.4   04-08    141  |  105  |  18.0  ----------------------------<  112  4.3   |  23.0  |  0.96    Ca    9.6      08 Apr 2017 06:57  Phos  3.7     04-08  Mg     1.9     04-08    TPro  7.9  /  Alb  3.9  /  TBili  0.4  /  DBili  x   /  AST  27  /  ALT  14  /  AlkPhos  64  04-07      PT/INR - ( 08 Apr 2017 21:30 )   PT: 11.7 sec;   INR: 1.06 ratio         PTT - ( 08 Apr 2017 21:30 )  PTT:68.1 sec      Objective:  T(C): 36.8, Max: 36.8 (04-08 @ 05:00)  HR: 72 (67 - 86)  BP: 154/70 (127/56 - 173/70)  RR: 15 (14 - 33)  SpO2: 96% (96% - 99%)  Wt(kg): --CAPILLARY BLOOD GLUCOSE  93 (08 Apr 2017 11:00)  113 (08 Apr 2017 08:00)  I&O's Summary  I & Os for 24h ending 08 Apr 2017 07:00  =============================================  IN: 234 ml / OUT: 800 ml / NET: -566 ml    I & Os for current day (as of 09 Apr 2017 00:43)  =============================================  IN: 598 ml / OUT: 1250 ml / NET: -652 ml      Physical Exam:  Neuro: a0x3  Pulm: cta b/l  CV: s1/s2  Abd: soft nt/nd

## 2017-04-09 NOTE — PROGRESS NOTE ADULT - ASSESSMENT
88F w/ HTN, HLD, PE, DVT & CAD presenting with "weird feeling in chest and arm" found to have triple vessel disease.

## 2017-04-09 NOTE — PROVIDER CONTACT NOTE (OTHER) - SITUATION
pt c/o cp 5 w/o radiation w/o dizziness after getting oob to br. pt returned to bed vss w/o c/o of further cp. cp subsided. Remains on hep gtt with therapeutic ptt

## 2017-04-10 ENCOUNTER — APPOINTMENT (OUTPATIENT)
Dept: CARDIOTHORACIC SURGERY | Facility: HOSPITAL | Age: 82
End: 2017-04-10
Payer: MEDICARE

## 2017-04-10 DIAGNOSIS — Z86.718 PERSONAL HISTORY OF OTHER VENOUS THROMBOSIS AND EMBOLISM: ICD-10-CM

## 2017-04-10 DIAGNOSIS — Z86.79 PERSONAL HISTORY OF OTHER DISEASES OF THE CIRCULATORY SYSTEM: ICD-10-CM

## 2017-04-10 DIAGNOSIS — Z87.891 PERSONAL HISTORY OF NICOTINE DEPENDENCE: ICD-10-CM

## 2017-04-10 DIAGNOSIS — I25.10 ATHEROSCLEROTIC HEART DISEASE OF NATIVE CORONARY ARTERY W/OUT ANGINA PECTORIS: ICD-10-CM

## 2017-04-10 DIAGNOSIS — Z86.711 PERSONAL HISTORY OF PULMONARY EMBOLISM: ICD-10-CM

## 2017-04-10 DIAGNOSIS — Z86.39 PERSONAL HISTORY OF OTHER ENDOCRINE, NUTRITIONAL AND METABOLIC DISEASE: ICD-10-CM

## 2017-04-10 DIAGNOSIS — Z86.69 PERSONAL HISTORY OF OTHER DISEASES OF THE NERVOUS SYSTEM AND SENSE ORGANS: ICD-10-CM

## 2017-04-10 PROBLEM — Z00.00 ENCOUNTER FOR PREVENTIVE HEALTH EXAMINATION: Noted: 2017-04-10

## 2017-04-10 LAB
ALBUMIN SERPL ELPH-MCNC: 2.7 G/DL — LOW (ref 3.3–5.2)
ALBUMIN SERPL ELPH-MCNC: 3.5 G/DL — SIGNIFICANT CHANGE UP (ref 3.3–5.2)
ALP SERPL-CCNC: 32 U/L — LOW (ref 40–120)
ALP SERPL-CCNC: 55 U/L — SIGNIFICANT CHANGE UP (ref 40–120)
ALT FLD-CCNC: 11 U/L — SIGNIFICANT CHANGE UP
ALT FLD-CCNC: 8 U/L — SIGNIFICANT CHANGE UP
ANION GAP SERPL CALC-SCNC: 12 MMOL/L — SIGNIFICANT CHANGE UP (ref 5–17)
ANION GAP SERPL CALC-SCNC: 15 MMOL/L — SIGNIFICANT CHANGE UP (ref 5–17)
APTT BLD: 30 SEC — SIGNIFICANT CHANGE UP (ref 27.5–37.4)
APTT BLD: 82.1 SEC — HIGH (ref 27.5–37.4)
AST SERPL-CCNC: 21 U/L — SIGNIFICANT CHANGE UP
AST SERPL-CCNC: 22 U/L — SIGNIFICANT CHANGE UP
BILIRUB SERPL-MCNC: 0.2 MG/DL — LOW (ref 0.4–2)
BILIRUB SERPL-MCNC: 1.3 MG/DL — SIGNIFICANT CHANGE UP (ref 0.4–2)
BLD GP AB SCN SERPL QL: SIGNIFICANT CHANGE UP
BUN SERPL-MCNC: 17 MG/DL — SIGNIFICANT CHANGE UP (ref 8–20)
BUN SERPL-MCNC: 26 MG/DL — HIGH (ref 8–20)
CALCIUM SERPL-MCNC: 8.3 MG/DL — LOW (ref 8.6–10.2)
CALCIUM SERPL-MCNC: 9.4 MG/DL — SIGNIFICANT CHANGE UP (ref 8.6–10.2)
CHLORIDE SERPL-SCNC: 104 MMOL/L — SIGNIFICANT CHANGE UP (ref 98–107)
CHLORIDE SERPL-SCNC: 105 MMOL/L — SIGNIFICANT CHANGE UP (ref 98–107)
CK MB CFR SERPL CALC: 10.7 NG/ML — HIGH (ref 0–6.7)
CK SERPL-CCNC: 187 U/L — HIGH (ref 25–170)
CO2 SERPL-SCNC: 21 MMOL/L — LOW (ref 22–29)
CO2 SERPL-SCNC: 24 MMOL/L — SIGNIFICANT CHANGE UP (ref 22–29)
CREAT SERPL-MCNC: 0.68 MG/DL — SIGNIFICANT CHANGE UP (ref 0.5–1.3)
CREAT SERPL-MCNC: 0.92 MG/DL — SIGNIFICANT CHANGE UP (ref 0.5–1.3)
GAS PNL BLDA: SIGNIFICANT CHANGE UP
GLUCOSE SERPL-MCNC: 106 MG/DL — SIGNIFICANT CHANGE UP (ref 70–115)
GLUCOSE SERPL-MCNC: 127 MG/DL — HIGH (ref 70–115)
HCT VFR BLD CALC: 29 % — LOW (ref 37–47)
HCT VFR BLD CALC: 29.9 % — LOW (ref 37–47)
HGB BLD-MCNC: 9.2 G/DL — LOW (ref 12–16)
HGB BLD-MCNC: 9.9 G/DL — LOW (ref 12–16)
INR BLD: 1.04 RATIO — SIGNIFICANT CHANGE UP (ref 0.88–1.16)
INR BLD: 1.42 RATIO — HIGH (ref 0.88–1.16)
MAGNESIUM SERPL-MCNC: 2 MG/DL — SIGNIFICANT CHANGE UP (ref 1.8–2.5)
MAGNESIUM SERPL-MCNC: 2.3 MG/DL — SIGNIFICANT CHANGE UP (ref 1.8–2.5)
MCHC RBC-ENTMCNC: 27.6 PG — SIGNIFICANT CHANGE UP (ref 27–31)
MCHC RBC-ENTMCNC: 27.7 PG — SIGNIFICANT CHANGE UP (ref 27–31)
MCHC RBC-ENTMCNC: 31.7 G/DL — LOW (ref 32–36)
MCHC RBC-ENTMCNC: 33.1 G/DL — SIGNIFICANT CHANGE UP (ref 32–36)
MCV RBC AUTO: 83.3 FL — SIGNIFICANT CHANGE UP (ref 81–99)
MCV RBC AUTO: 87.3 FL — SIGNIFICANT CHANGE UP (ref 81–99)
PHOSPHATE SERPL-MCNC: 2.2 MG/DL — LOW (ref 2.4–4.7)
PHOSPHATE SERPL-MCNC: 3.6 MG/DL — SIGNIFICANT CHANGE UP (ref 2.4–4.7)
PLATELET # BLD AUTO: 217 K/UL — SIGNIFICANT CHANGE UP (ref 150–400)
PLATELET # BLD AUTO: 83 K/UL — LOW (ref 150–400)
POTASSIUM SERPL-MCNC: 4.2 MMOL/L — SIGNIFICANT CHANGE UP (ref 3.5–5.3)
POTASSIUM SERPL-MCNC: 4.3 MMOL/L — SIGNIFICANT CHANGE UP (ref 3.5–5.3)
POTASSIUM SERPL-SCNC: 4.2 MMOL/L — SIGNIFICANT CHANGE UP (ref 3.5–5.3)
POTASSIUM SERPL-SCNC: 4.3 MMOL/L — SIGNIFICANT CHANGE UP (ref 3.5–5.3)
PROT SERPL-MCNC: 4.5 G/DL — LOW (ref 6.6–8.7)
PROT SERPL-MCNC: 6.8 G/DL — SIGNIFICANT CHANGE UP (ref 6.6–8.7)
PROTHROM AB SERPL-ACNC: 11.4 SEC — SIGNIFICANT CHANGE UP (ref 9.8–12.7)
PROTHROM AB SERPL-ACNC: 15.7 SEC — HIGH (ref 9.8–12.7)
RBC # BLD: 3.32 M/UL — LOW (ref 4.4–5.2)
RBC # BLD: 3.59 M/UL — LOW (ref 4.4–5.2)
RBC # FLD: 14.3 % — SIGNIFICANT CHANGE UP (ref 11–15.6)
RBC # FLD: 15.6 % — SIGNIFICANT CHANGE UP (ref 11–15.6)
SODIUM SERPL-SCNC: 140 MMOL/L — SIGNIFICANT CHANGE UP (ref 135–145)
SODIUM SERPL-SCNC: 141 MMOL/L — SIGNIFICANT CHANGE UP (ref 135–145)
TROPONIN T SERPL-MCNC: 0.25 NG/ML — HIGH (ref 0–0.06)
TYPE + AB SCN PNL BLD: SIGNIFICANT CHANGE UP
WBC # BLD: 5.6 K/UL — SIGNIFICANT CHANGE UP (ref 4.8–10.8)
WBC # BLD: 9.2 K/UL — SIGNIFICANT CHANGE UP (ref 4.8–10.8)
WBC # FLD AUTO: 5.6 K/UL — SIGNIFICANT CHANGE UP (ref 4.8–10.8)
WBC # FLD AUTO: 9.2 K/UL — SIGNIFICANT CHANGE UP (ref 4.8–10.8)

## 2017-04-10 PROCEDURE — 33517 CABG ARTERY-VEIN SINGLE: CPT | Mod: AS

## 2017-04-10 PROCEDURE — 33533 CABG ARTERIAL SINGLE: CPT | Mod: AS

## 2017-04-10 PROCEDURE — 71010: CPT | Mod: 26

## 2017-04-10 PROCEDURE — 33533 CABG ARTERIAL SINGLE: CPT

## 2017-04-10 PROCEDURE — 33517 CABG ARTERY-VEIN SINGLE: CPT

## 2017-04-10 RX ORDER — FENTANYL CITRATE 50 UG/ML
12.5 INJECTION INTRAVENOUS ONCE
Qty: 0 | Refills: 0 | Status: DISCONTINUED | OUTPATIENT
Start: 2017-04-10 | End: 2017-04-10

## 2017-04-10 RX ORDER — SODIUM CHLORIDE 9 MG/ML
1000 INJECTION INTRAMUSCULAR; INTRAVENOUS; SUBCUTANEOUS
Qty: 0 | Refills: 0 | Status: DISCONTINUED | OUTPATIENT
Start: 2017-04-10 | End: 2017-04-12

## 2017-04-10 RX ORDER — POTASSIUM CHLORIDE 20 MEQ
10 PACKET (EA) ORAL
Qty: 0 | Refills: 0 | Status: COMPLETED | OUTPATIENT
Start: 2017-04-10 | End: 2017-04-10

## 2017-04-10 RX ORDER — ATORVASTATIN CALCIUM 80 MG/1
40 TABLET, FILM COATED ORAL AT BEDTIME
Qty: 0 | Refills: 0 | Status: DISCONTINUED | OUTPATIENT
Start: 2017-04-10 | End: 2017-04-16

## 2017-04-10 RX ORDER — CLOPIDOGREL BISULFATE 75 MG/1
75 TABLET, FILM COATED ORAL DAILY
Qty: 0 | Refills: 0 | Status: DISCONTINUED | OUTPATIENT
Start: 2017-04-10 | End: 2017-04-16

## 2017-04-10 RX ORDER — PANTOPRAZOLE SODIUM 20 MG/1
40 TABLET, DELAYED RELEASE ORAL ONCE
Qty: 0 | Refills: 0 | Status: COMPLETED | OUTPATIENT
Start: 2017-04-10 | End: 2017-04-10

## 2017-04-10 RX ORDER — POTASSIUM CHLORIDE 20 MEQ
10 PACKET (EA) ORAL
Qty: 0 | Refills: 0 | Status: DISCONTINUED | OUTPATIENT
Start: 2017-04-10 | End: 2017-04-11

## 2017-04-10 RX ORDER — MEPERIDINE HYDROCHLORIDE 50 MG/ML
25 INJECTION INTRAMUSCULAR; INTRAVENOUS; SUBCUTANEOUS ONCE
Qty: 0 | Refills: 0 | Status: DISCONTINUED | OUTPATIENT
Start: 2017-04-10 | End: 2017-04-11

## 2017-04-10 RX ORDER — ACETAMINOPHEN 500 MG
1000 TABLET ORAL ONCE
Qty: 0 | Refills: 0 | Status: COMPLETED | OUTPATIENT
Start: 2017-04-10 | End: 2017-04-10

## 2017-04-10 RX ORDER — POTASSIUM CHLORIDE 20 MEQ
10 PACKET (EA) ORAL ONCE
Qty: 0 | Refills: 0 | Status: DISCONTINUED | OUTPATIENT
Start: 2017-04-10 | End: 2017-04-11

## 2017-04-10 RX ORDER — ASPIRIN/CALCIUM CARB/MAGNESIUM 324 MG
325 TABLET ORAL ONCE
Qty: 0 | Refills: 0 | Status: COMPLETED | OUTPATIENT
Start: 2017-04-10 | End: 2017-04-10

## 2017-04-10 RX ORDER — SODIUM CHLORIDE 9 MG/ML
500 INJECTION, SOLUTION INTRAVENOUS ONCE
Qty: 0 | Refills: 0 | Status: COMPLETED | OUTPATIENT
Start: 2017-04-10 | End: 2017-04-10

## 2017-04-10 RX ORDER — CEFUROXIME AXETIL 250 MG
1500 TABLET ORAL EVERY 8 HOURS
Qty: 0 | Refills: 0 | Status: COMPLETED | OUTPATIENT
Start: 2017-04-10 | End: 2017-04-11

## 2017-04-10 RX ORDER — ASPIRIN/CALCIUM CARB/MAGNESIUM 324 MG
325 TABLET ORAL DAILY
Qty: 0 | Refills: 0 | Status: DISCONTINUED | OUTPATIENT
Start: 2017-04-11 | End: 2017-04-16

## 2017-04-10 RX ORDER — VANCOMYCIN HCL 1 G
1000 VIAL (EA) INTRAVENOUS EVERY 12 HOURS
Qty: 0 | Refills: 0 | Status: COMPLETED | OUTPATIENT
Start: 2017-04-10 | End: 2017-04-12

## 2017-04-10 RX ORDER — PANTOPRAZOLE SODIUM 20 MG/1
40 TABLET, DELAYED RELEASE ORAL
Qty: 0 | Refills: 0 | Status: DISCONTINUED | OUTPATIENT
Start: 2017-04-11 | End: 2017-04-16

## 2017-04-10 RX ORDER — ALBUMIN HUMAN 25 %
250 VIAL (ML) INTRAVENOUS ONCE
Qty: 0 | Refills: 0 | Status: COMPLETED | OUTPATIENT
Start: 2017-04-10 | End: 2017-04-10

## 2017-04-10 RX ORDER — INSULIN HUMAN 100 [IU]/ML
1.5 INJECTION, SOLUTION SUBCUTANEOUS
Qty: 250 | Refills: 0 | Status: DISCONTINUED | OUTPATIENT
Start: 2017-04-10 | End: 2017-04-11

## 2017-04-10 RX ORDER — DOCUSATE SODIUM 100 MG
100 CAPSULE ORAL THREE TIMES A DAY
Qty: 0 | Refills: 0 | Status: DISCONTINUED | OUTPATIENT
Start: 2017-04-10 | End: 2017-04-16

## 2017-04-10 RX ADMIN — CHLORHEXIDINE GLUCONATE 1 APPLICATION(S): 213 SOLUTION TOPICAL at 05:05

## 2017-04-10 RX ADMIN — INSULIN HUMAN 1.5 UNIT(S)/HR: 100 INJECTION, SOLUTION SUBCUTANEOUS at 15:16

## 2017-04-10 RX ADMIN — ATORVASTATIN CALCIUM 40 MILLIGRAM(S): 80 TABLET, FILM COATED ORAL at 22:19

## 2017-04-10 RX ADMIN — Medication 125 MILLILITER(S): at 14:21

## 2017-04-10 RX ADMIN — Medication 100 MILLIEQUIVALENT(S): at 15:00

## 2017-04-10 RX ADMIN — CLOPIDOGREL BISULFATE 75 MILLIGRAM(S): 75 TABLET, FILM COATED ORAL at 22:19

## 2017-04-10 RX ADMIN — Medication 100 MILLIGRAM(S): at 23:58

## 2017-04-10 RX ADMIN — Medication 100 MILLIGRAM(S): at 05:05

## 2017-04-10 RX ADMIN — Medication 325 MILLIGRAM(S): at 22:22

## 2017-04-10 RX ADMIN — SODIUM CHLORIDE 5 MILLILITER(S): 9 INJECTION INTRAMUSCULAR; INTRAVENOUS; SUBCUTANEOUS at 16:12

## 2017-04-10 RX ADMIN — INSULIN HUMAN 1.5 UNIT(S)/HR: 100 INJECTION, SOLUTION SUBCUTANEOUS at 16:15

## 2017-04-10 RX ADMIN — Medication 400 MILLIGRAM(S): at 20:53

## 2017-04-10 RX ADMIN — PANTOPRAZOLE SODIUM 40 MILLIGRAM(S): 20 TABLET, DELAYED RELEASE ORAL at 14:23

## 2017-04-10 RX ADMIN — Medication 1000 MILLIGRAM(S): at 21:08

## 2017-04-10 RX ADMIN — Medication 100 MILLIGRAM(S): at 15:19

## 2017-04-10 RX ADMIN — Medication 100 MILLIEQUIVALENT(S): at 13:00

## 2017-04-10 RX ADMIN — SODIUM CHLORIDE 10 MILLILITER(S): 9 INJECTION INTRAMUSCULAR; INTRAVENOUS; SUBCUTANEOUS at 14:55

## 2017-04-10 RX ADMIN — Medication 100 MILLIGRAM(S): at 22:19

## 2017-04-10 RX ADMIN — Medication 250 MILLIGRAM(S): at 20:23

## 2017-04-10 RX ADMIN — CHLORHEXIDINE GLUCONATE 15 MILLILITER(S): 213 SOLUTION TOPICAL at 05:05

## 2017-04-10 RX ADMIN — SODIUM CHLORIDE 1500 MILLILITER(S): 9 INJECTION, SOLUTION INTRAVENOUS at 12:40

## 2017-04-10 RX ADMIN — FENTANYL CITRATE 12.5 MICROGRAM(S): 50 INJECTION INTRAVENOUS at 23:58

## 2017-04-10 RX ADMIN — MUPIROCIN 1 APPLICATION(S): 20 OINTMENT TOPICAL at 05:05

## 2017-04-11 DIAGNOSIS — I25.119 ATHEROSCLEROTIC HEART DISEASE OF NATIVE CORONARY ARTERY WITH UNSPECIFIED ANGINA PECTORIS: ICD-10-CM

## 2017-04-11 DIAGNOSIS — I27.82 CHRONIC PULMONARY EMBOLISM: ICD-10-CM

## 2017-04-11 DIAGNOSIS — R73.9 HYPERGLYCEMIA, UNSPECIFIED: ICD-10-CM

## 2017-04-11 LAB
ALBUMIN SERPL ELPH-MCNC: 3.1 G/DL — LOW (ref 3.3–5.2)
ALP SERPL-CCNC: 38 U/L — LOW (ref 40–120)
ALT FLD-CCNC: 11 U/L — SIGNIFICANT CHANGE UP
ANION GAP SERPL CALC-SCNC: 13 MMOL/L — SIGNIFICANT CHANGE UP (ref 5–17)
APTT BLD: 26.8 SEC — LOW (ref 27.5–37.4)
AST SERPL-CCNC: 34 U/L — HIGH
BILIRUB DIRECT SERPL-MCNC: 0.1 MG/DL — SIGNIFICANT CHANGE UP (ref 0–0.3)
BILIRUB INDIRECT FLD-MCNC: 0.4 MG/DL — SIGNIFICANT CHANGE UP (ref 0.2–1)
BILIRUB SERPL-MCNC: 0.5 MG/DL — SIGNIFICANT CHANGE UP (ref 0.4–2)
BUN SERPL-MCNC: 21 MG/DL — HIGH (ref 8–20)
CALCIUM SERPL-MCNC: 8.7 MG/DL — SIGNIFICANT CHANGE UP (ref 8.6–10.2)
CHLORIDE SERPL-SCNC: 105 MMOL/L — SIGNIFICANT CHANGE UP (ref 98–107)
CK MB CFR SERPL CALC: 10.1 NG/ML — HIGH (ref 0–6.7)
CK MB CFR SERPL CALC: 14.2 NG/ML — HIGH (ref 0–6.7)
CK SERPL-CCNC: 313 U/L — HIGH (ref 25–170)
CK SERPL-CCNC: 388 U/L — HIGH (ref 25–170)
CO2 SERPL-SCNC: 22 MMOL/L — SIGNIFICANT CHANGE UP (ref 22–29)
CREAT SERPL-MCNC: 0.93 MG/DL — SIGNIFICANT CHANGE UP (ref 0.5–1.3)
GLUCOSE SERPL-MCNC: 108 MG/DL — SIGNIFICANT CHANGE UP (ref 70–115)
HCT VFR BLD CALC: 34.5 % — LOW (ref 37–47)
HGB BLD-MCNC: 11.4 G/DL — LOW (ref 12–16)
INR BLD: 1.06 RATIO — SIGNIFICANT CHANGE UP (ref 0.88–1.16)
MAGNESIUM SERPL-MCNC: 2 MG/DL — SIGNIFICANT CHANGE UP (ref 1.8–2.5)
MCHC RBC-ENTMCNC: 27.6 PG — SIGNIFICANT CHANGE UP (ref 27–31)
MCHC RBC-ENTMCNC: 33 G/DL — SIGNIFICANT CHANGE UP (ref 32–36)
MCV RBC AUTO: 83.5 FL — SIGNIFICANT CHANGE UP (ref 81–99)
PHOSPHATE SERPL-MCNC: 3.7 MG/DL — SIGNIFICANT CHANGE UP (ref 2.4–4.7)
PLATELET # BLD AUTO: 135 K/UL — LOW (ref 150–400)
POTASSIUM SERPL-MCNC: 4.6 MMOL/L — SIGNIFICANT CHANGE UP (ref 3.5–5.3)
POTASSIUM SERPL-SCNC: 4.6 MMOL/L — SIGNIFICANT CHANGE UP (ref 3.5–5.3)
PROT SERPL-MCNC: 5.3 G/DL — LOW (ref 6.6–8.7)
PROTHROM AB SERPL-ACNC: 11.7 SEC — SIGNIFICANT CHANGE UP (ref 9.8–12.7)
RBC # BLD: 4.13 M/UL — LOW (ref 4.4–5.2)
RBC # FLD: 16.4 % — HIGH (ref 11–15.6)
SODIUM SERPL-SCNC: 140 MMOL/L — SIGNIFICANT CHANGE UP (ref 135–145)
TROPONIN T SERPL-MCNC: 0.2 NG/ML — HIGH (ref 0–0.06)
WBC # BLD: 12.7 K/UL — HIGH (ref 4.8–10.8)
WBC # FLD AUTO: 12.7 K/UL — HIGH (ref 4.8–10.8)

## 2017-04-11 PROCEDURE — 71010: CPT | Mod: 26

## 2017-04-11 PROCEDURE — 93010 ELECTROCARDIOGRAM REPORT: CPT

## 2017-04-11 RX ORDER — ATORVASTATIN CALCIUM 80 MG/1
10 TABLET, FILM COATED ORAL AT BEDTIME
Qty: 0 | Refills: 0 | Status: DISCONTINUED | OUTPATIENT
Start: 2017-04-11 | End: 2017-04-11

## 2017-04-11 RX ORDER — METOPROLOL TARTRATE 50 MG
25 TABLET ORAL EVERY 8 HOURS
Qty: 0 | Refills: 0 | Status: DISCONTINUED | OUTPATIENT
Start: 2017-04-11 | End: 2017-04-12

## 2017-04-11 RX ORDER — GLUCAGON INJECTION, SOLUTION 0.5 MG/.1ML
1 INJECTION, SOLUTION SUBCUTANEOUS ONCE
Qty: 0 | Refills: 0 | Status: DISCONTINUED | OUTPATIENT
Start: 2017-04-11 | End: 2017-04-13

## 2017-04-11 RX ORDER — DEXTROSE 50 % IN WATER 50 %
25 SYRINGE (ML) INTRAVENOUS ONCE
Qty: 0 | Refills: 0 | Status: DISCONTINUED | OUTPATIENT
Start: 2017-04-11 | End: 2017-04-13

## 2017-04-11 RX ORDER — OXYCODONE HYDROCHLORIDE 5 MG/1
5 TABLET ORAL EVERY 4 HOURS
Qty: 0 | Refills: 0 | Status: DISCONTINUED | OUTPATIENT
Start: 2017-04-11 | End: 2017-04-16

## 2017-04-11 RX ORDER — INSULIN LISPRO 100/ML
VIAL (ML) SUBCUTANEOUS
Qty: 0 | Refills: 0 | Status: DISCONTINUED | OUTPATIENT
Start: 2017-04-11 | End: 2017-04-11

## 2017-04-11 RX ORDER — ENOXAPARIN SODIUM 100 MG/ML
40 INJECTION SUBCUTANEOUS DAILY
Qty: 0 | Refills: 0 | Status: DISCONTINUED | OUTPATIENT
Start: 2017-04-11 | End: 2017-04-16

## 2017-04-11 RX ORDER — FUROSEMIDE 40 MG
20 TABLET ORAL ONCE
Qty: 0 | Refills: 0 | Status: COMPLETED | OUTPATIENT
Start: 2017-04-11 | End: 2017-04-11

## 2017-04-11 RX ORDER — ACETAMINOPHEN 500 MG
1000 TABLET ORAL ONCE
Qty: 0 | Refills: 0 | Status: COMPLETED | OUTPATIENT
Start: 2017-04-11 | End: 2017-04-11

## 2017-04-11 RX ORDER — OXYCODONE HYDROCHLORIDE 5 MG/1
10 TABLET ORAL EVERY 4 HOURS
Qty: 0 | Refills: 0 | Status: DISCONTINUED | OUTPATIENT
Start: 2017-04-11 | End: 2017-04-12

## 2017-04-11 RX ORDER — DEXTROSE 50 % IN WATER 50 %
12.5 SYRINGE (ML) INTRAVENOUS ONCE
Qty: 0 | Refills: 0 | Status: DISCONTINUED | OUTPATIENT
Start: 2017-04-11 | End: 2017-04-13

## 2017-04-11 RX ORDER — METOPROLOL TARTRATE 50 MG
25 TABLET ORAL
Qty: 0 | Refills: 0 | Status: DISCONTINUED | OUTPATIENT
Start: 2017-04-11 | End: 2017-04-11

## 2017-04-11 RX ORDER — SODIUM CHLORIDE 9 MG/ML
500 INJECTION, SOLUTION INTRAVENOUS ONCE
Qty: 0 | Refills: 0 | Status: COMPLETED | OUTPATIENT
Start: 2017-04-11 | End: 2017-04-11

## 2017-04-11 RX ORDER — DEXTROSE 50 % IN WATER 50 %
1 SYRINGE (ML) INTRAVENOUS ONCE
Qty: 0 | Refills: 0 | Status: DISCONTINUED | OUTPATIENT
Start: 2017-04-11 | End: 2017-04-13

## 2017-04-11 RX ORDER — FENTANYL CITRATE 50 UG/ML
12.5 INJECTION INTRAVENOUS ONCE
Qty: 0 | Refills: 0 | Status: DISCONTINUED | OUTPATIENT
Start: 2017-04-11 | End: 2017-04-11

## 2017-04-11 RX ORDER — SODIUM CHLORIDE 9 MG/ML
1000 INJECTION, SOLUTION INTRAVENOUS
Qty: 0 | Refills: 0 | Status: DISCONTINUED | OUTPATIENT
Start: 2017-04-11 | End: 2017-04-12

## 2017-04-11 RX ORDER — ACETAMINOPHEN 500 MG
1000 TABLET ORAL ONCE
Qty: 0 | Refills: 0 | Status: DISCONTINUED | OUTPATIENT
Start: 2017-04-11 | End: 2017-04-16

## 2017-04-11 RX ORDER — INSULIN LISPRO 100/ML
2 VIAL (ML) SUBCUTANEOUS
Qty: 0 | Refills: 0 | Status: DISCONTINUED | OUTPATIENT
Start: 2017-04-11 | End: 2017-04-12

## 2017-04-11 RX ORDER — INSULIN LISPRO 100/ML
2 VIAL (ML) SUBCUTANEOUS
Qty: 0 | Refills: 0 | Status: DISCONTINUED | OUTPATIENT
Start: 2017-04-11 | End: 2017-04-11

## 2017-04-11 RX ORDER — ALBUMIN HUMAN 25 %
250 VIAL (ML) INTRAVENOUS ONCE
Qty: 0 | Refills: 0 | Status: COMPLETED | OUTPATIENT
Start: 2017-04-11 | End: 2017-04-11

## 2017-04-11 RX ORDER — WARFARIN SODIUM 2.5 MG/1
5 TABLET ORAL ONCE
Qty: 0 | Refills: 0 | Status: COMPLETED | OUTPATIENT
Start: 2017-04-11 | End: 2017-04-11

## 2017-04-11 RX ADMIN — Medication 2: at 11:43

## 2017-04-11 RX ADMIN — FENTANYL CITRATE 12.5 MICROGRAM(S): 50 INJECTION INTRAVENOUS at 02:16

## 2017-04-11 RX ADMIN — Medication 100 MILLIGRAM(S): at 15:40

## 2017-04-11 RX ADMIN — Medication 25 MILLIGRAM(S): at 06:28

## 2017-04-11 RX ADMIN — ENOXAPARIN SODIUM 40 MILLIGRAM(S): 100 INJECTION SUBCUTANEOUS at 11:45

## 2017-04-11 RX ADMIN — Medication 1000 MILLIGRAM(S): at 04:54

## 2017-04-11 RX ADMIN — Medication 325 MILLIGRAM(S): at 11:45

## 2017-04-11 RX ADMIN — WARFARIN SODIUM 5 MILLIGRAM(S): 2.5 TABLET ORAL at 22:30

## 2017-04-11 RX ADMIN — Medication 2 UNIT(S): at 09:15

## 2017-04-11 RX ADMIN — Medication 100 MILLIGRAM(S): at 07:07

## 2017-04-11 RX ADMIN — FENTANYL CITRATE 12.5 MICROGRAM(S): 50 INJECTION INTRAVENOUS at 02:31

## 2017-04-11 RX ADMIN — Medication 250 MILLIGRAM(S): at 20:38

## 2017-04-11 RX ADMIN — Medication 25 MILLIGRAM(S): at 22:30

## 2017-04-11 RX ADMIN — Medication 100 MILLIGRAM(S): at 22:30

## 2017-04-11 RX ADMIN — Medication 500 MILLILITER(S): at 04:39

## 2017-04-11 RX ADMIN — Medication 2 UNIT(S): at 16:40

## 2017-04-11 RX ADMIN — SODIUM CHLORIDE 6000 MILLILITER(S): 9 INJECTION, SOLUTION INTRAVENOUS at 04:40

## 2017-04-11 RX ADMIN — CLOPIDOGREL BISULFATE 75 MILLIGRAM(S): 75 TABLET, FILM COATED ORAL at 11:45

## 2017-04-11 RX ADMIN — Medication 2 UNIT(S): at 11:44

## 2017-04-11 RX ADMIN — Medication 250 MILLIGRAM(S): at 09:15

## 2017-04-11 RX ADMIN — FENTANYL CITRATE 12.5 MICROGRAM(S): 50 INJECTION INTRAVENOUS at 00:14

## 2017-04-11 RX ADMIN — Medication 100 MILLIGRAM(S): at 13:20

## 2017-04-11 RX ADMIN — Medication 20 MILLIGRAM(S): at 11:01

## 2017-04-11 RX ADMIN — ATORVASTATIN CALCIUM 40 MILLIGRAM(S): 80 TABLET, FILM COATED ORAL at 22:30

## 2017-04-11 RX ADMIN — PANTOPRAZOLE SODIUM 40 MILLIGRAM(S): 20 TABLET, DELAYED RELEASE ORAL at 06:28

## 2017-04-11 RX ADMIN — Medication 400 MILLIGRAM(S): at 04:39

## 2017-04-11 RX ADMIN — Medication 100 MILLIGRAM(S): at 06:28

## 2017-04-11 RX ADMIN — Medication 25 MILLIGRAM(S): at 13:20

## 2017-04-11 NOTE — PROGRESS NOTE ADULT - SUBJECTIVE AND OBJECTIVE BOX
Subjective: c/o incisional pain, relived with fentanyl and tylenol PRN    T(C): 36.5, Max: 37.1 (04-10 @ 15:30)  HR: 85 (74 - 85)  BP: 158/72 (126/76 - 158/72)  ABP: 158/48 (106/44 - 158/48)  ABP(mean): 86 (65 - 90)  RR: 12 (8 - 24)  SpO2: 93% (92% - 100%)  CVP(mm Hg): 2 (0 - 12)  PA: --  Mode: CPAP with PS  FiO2: 40  PEEP: 5  PS: 5  MAP: 6      04-10    140  |  104  |  17.0  ----------------------------<  127<H>  4.2   |  21.0<L>  |  0.68    Ca    8.3<L>      10 Apr 2017 13:02  Phos  2.2     04-10  Mg     2.3     04-10    TPro  4.5<L>  /  Alb  2.7<L>  /  TBili  1.3  /  DBili  x   /  AST  22  /  ALT  8   /  AlkPhos  32<L>  04-10                               9.9    9.2   )-----------( 83       ( 10 Apr 2017 13:02 )             29.9        PT/INR - ( 10 Apr 2017 13:02 )   PT: 15.7 sec;   INR: 1.42 ratio         PTT - ( 10 Apr 2017 13:02 )  PTT:30.0 sec  ABG - ( 10 Apr 2017 21:50 )  pH: 7.35  /  pCO2: 42    /  pO2: 118   / HCO3: 22    / Base Excess: -2.5  /  SaO2: 99         CAPILLARY BLOOD GLUCOSE  108 (11 Apr 2017 03:00)  114 (11 Apr 2017 02:00)  113 (11 Apr 2017 01:00)  114 (11 Apr 2017 00:00)  122 (10 Apr 2017 23:00)  133 (10 Apr 2017 22:00)  138 (10 Apr 2017 21:00)  136 (10 Apr 2017 20:00)  142 (10 Apr 2017 18:30)  158 (10 Apr 2017 17:30)  166 (10 Apr 2017 16:30)  166 (10 Apr 2017 15:15)  146 (10 Apr 2017 14:30)  120 (10 Apr 2017 12:45)    CXR:    I&O's Detail  I & Os for 24h ending 10 Apr 2017 07:00  =============================================  IN:    heparin Infusion: 136 ml    Oral Fluid: 100 ml    Total IN: 236 ml  ---------------------------------------------  OUT:    Voided: 800 ml    Total OUT: 800 ml  ---------------------------------------------  Total NET: -564 ml    I & Os for current day (as of 11 Apr 2017 04:12)  =============================================  IN:    Lactated Ringers IV Bolus: 500 ml    Solution: 250 ml    sodium chloride 0.9%.: 160 ml    Oral Fluid: 120 ml    Solution: 100 ml    Solution: 100 ml    sodium chloride 0.9%.: 80 ml    Solution: 50 ml    insulin Infusion: 24 ml    Total IN: 1384 ml  ---------------------------------------------  OUT:    Indwelling Catheter - Urethral: 1035 ml    Chest Tube: 140 ml    Chest Tube: 100 ml    Total OUT: 1275 ml  ---------------------------------------------  Total NET: 109 ml    Drug Dosing Weight  Height (cm): 152.4 (09 Apr 2017 23:31)  Weight (kg): 60.8 (09 Apr 2017 23:31)  BMI (kg/m2): 26.2 (09 Apr 2017 23:31)  BSA (m2): 1.57 (09 Apr 2017 23:31)    MEDICATIONS  (STANDING):  aspirin enteric coated 325milliGRAM(s) Oral daily  cefuroxime  IVPB 1500milliGRAM(s) IV Intermittent every 8 hours  vancomycin  IVPB 1000milliGRAM(s) IV Intermittent every 12 hours  potassium chloride  10 mEq/50 mL IVPB 10milliEquivalent(s) IV Intermittent every 1 hour  potassium chloride  10 mEq/50 mL IVPB 10milliEquivalent(s) IV Intermittent every 1 hour  potassium chloride  10 mEq/50 mL IVPB 10milliEquivalent(s) IV Intermittent once  atorvastatin 40milliGRAM(s) Oral at bedtime  docusate sodium 100milliGRAM(s) Oral three times a day  pantoprazole    Tablet 40milliGRAM(s) Oral before breakfast  clopidogrel Tablet 75milliGRAM(s) Oral daily  insulin Infusion 1.5Unit(s)/Hr IV Continuous <Continuous>  sodium chloride 0.9%. 1000milliLiter(s) IV Continuous <Continuous>  sodium chloride 0.9%. 1000milliLiter(s) IV Continuous <Continuous>    MEDICATIONS  (PRN):  oxyCODONE  5 mG/acetaminophen 325 mG 1Tablet(s) Oral every 4 hours PRN Mild Pain  oxyCODONE  5 mG/acetaminophen 325 mG 2Tablet(s) Oral every 6 hours PRN Moderate Pain  meperidine     Injectable 25milliGRAM(s) IV Push once PRN For Shivering      Physical Exam  Neuro: A&Ox3  Pulm: clear anteriorly  CV: S1S2 RRR  Abd: hypoactive BS soft NT ND  Extrem/MS:  no edema, RLE ace wrap intact + DP pulses b/l  Incision(s): mid sternal dsg C/D/I

## 2017-04-11 NOTE — PHYSICAL THERAPY INITIAL EVALUATION ADULT - GENERAL OBSERVATIONS, REHAB EVAL
Pt. OOB; +monitor, O2 nasal canula, A-line, temp. pacer box, chest tubes, frias; alert and cooperative

## 2017-04-11 NOTE — PHYSICAL THERAPY INITIAL EVALUATION ADULT - ADDITIONAL COMMENTS
Pt. lives in a private home with 1 step to enter and full flight with handrail to bedroom.  Pt. uses a straight cane for outdoor ambulation.

## 2017-04-11 NOTE — PROGRESS NOTE ADULT - ASSESSMENT
88F, pmhx DVT, PE CAD, HTN, HLD, hysterectomy, foot surgery, carpel tunnel, cataracts, presented for cath after syncopal episode found to have LM, LAD and Cx Dz, now s/p C2L 4/10/17 with Dr. Griffin;

## 2017-04-12 ENCOUNTER — TRANSCRIPTION ENCOUNTER (OUTPATIENT)
Age: 82
End: 2017-04-12

## 2017-04-12 LAB
ANION GAP SERPL CALC-SCNC: 9 MMOL/L — SIGNIFICANT CHANGE UP (ref 5–17)
BUN SERPL-MCNC: 23 MG/DL — HIGH (ref 8–20)
CALCIUM SERPL-MCNC: 8.4 MG/DL — LOW (ref 8.6–10.2)
CHLORIDE SERPL-SCNC: 105 MMOL/L — SIGNIFICANT CHANGE UP (ref 98–107)
CO2 SERPL-SCNC: 27 MMOL/L — SIGNIFICANT CHANGE UP (ref 22–29)
CREAT SERPL-MCNC: 0.98 MG/DL — SIGNIFICANT CHANGE UP (ref 0.5–1.3)
GLUCOSE SERPL-MCNC: 140 MG/DL — HIGH (ref 70–115)
HCT VFR BLD CALC: 32.7 % — LOW (ref 37–47)
HGB BLD-MCNC: 10.3 G/DL — LOW (ref 12–16)
INR BLD: 1.04 RATIO — SIGNIFICANT CHANGE UP (ref 0.88–1.16)
MAGNESIUM SERPL-MCNC: 1.9 MG/DL — SIGNIFICANT CHANGE UP (ref 1.8–2.5)
MCHC RBC-ENTMCNC: 26.9 PG — LOW (ref 27–31)
MCHC RBC-ENTMCNC: 31.5 G/DL — LOW (ref 32–36)
MCV RBC AUTO: 85.4 FL — SIGNIFICANT CHANGE UP (ref 81–99)
PHOSPHATE SERPL-MCNC: 3.4 MG/DL — SIGNIFICANT CHANGE UP (ref 2.4–4.7)
PLATELET # BLD AUTO: 139 K/UL — LOW (ref 150–400)
POTASSIUM SERPL-MCNC: 4.6 MMOL/L — SIGNIFICANT CHANGE UP (ref 3.5–5.3)
POTASSIUM SERPL-SCNC: 4.6 MMOL/L — SIGNIFICANT CHANGE UP (ref 3.5–5.3)
PROTHROM AB SERPL-ACNC: 11.5 SEC — SIGNIFICANT CHANGE UP (ref 9.8–12.7)
RBC # BLD: 3.83 M/UL — LOW (ref 4.4–5.2)
RBC # FLD: 16.4 % — HIGH (ref 11–15.6)
SODIUM SERPL-SCNC: 141 MMOL/L — SIGNIFICANT CHANGE UP (ref 135–145)
WBC # BLD: 11 K/UL — HIGH (ref 4.8–10.8)
WBC # FLD AUTO: 11 K/UL — HIGH (ref 4.8–10.8)

## 2017-04-12 PROCEDURE — 93010 ELECTROCARDIOGRAM REPORT: CPT

## 2017-04-12 PROCEDURE — 71010: CPT | Mod: 26

## 2017-04-12 RX ORDER — WARFARIN SODIUM 2.5 MG/1
5 TABLET ORAL ONCE
Qty: 0 | Refills: 0 | Status: COMPLETED | OUTPATIENT
Start: 2017-04-12 | End: 2017-04-12

## 2017-04-12 RX ORDER — LISINOPRIL 2.5 MG/1
10 TABLET ORAL DAILY
Qty: 0 | Refills: 0 | Status: DISCONTINUED | OUTPATIENT
Start: 2017-04-12 | End: 2017-04-15

## 2017-04-12 RX ORDER — SODIUM CHLORIDE 9 MG/ML
3 INJECTION INTRAMUSCULAR; INTRAVENOUS; SUBCUTANEOUS EVERY 8 HOURS
Qty: 0 | Refills: 0 | Status: DISCONTINUED | OUTPATIENT
Start: 2017-04-12 | End: 2017-04-16

## 2017-04-12 RX ORDER — MAGNESIUM SULFATE 500 MG/ML
2 VIAL (ML) INJECTION ONCE
Qty: 0 | Refills: 0 | Status: COMPLETED | OUTPATIENT
Start: 2017-04-12 | End: 2017-04-12

## 2017-04-12 RX ORDER — METOPROLOL TARTRATE 50 MG
50 TABLET ORAL
Qty: 0 | Refills: 0 | Status: DISCONTINUED | OUTPATIENT
Start: 2017-04-12 | End: 2017-04-14

## 2017-04-12 RX ADMIN — Medication 100 MILLIGRAM(S): at 06:13

## 2017-04-12 RX ADMIN — PANTOPRAZOLE SODIUM 40 MILLIGRAM(S): 20 TABLET, DELAYED RELEASE ORAL at 06:13

## 2017-04-12 RX ADMIN — Medication 100 MILLIGRAM(S): at 13:39

## 2017-04-12 RX ADMIN — Medication 325 MILLIGRAM(S): at 11:52

## 2017-04-12 RX ADMIN — ATORVASTATIN CALCIUM 40 MILLIGRAM(S): 80 TABLET, FILM COATED ORAL at 21:45

## 2017-04-12 RX ADMIN — ENOXAPARIN SODIUM 40 MILLIGRAM(S): 100 INJECTION SUBCUTANEOUS at 11:52

## 2017-04-12 RX ADMIN — Medication 50 GRAM(S): at 04:57

## 2017-04-12 RX ADMIN — OXYCODONE HYDROCHLORIDE 10 MILLIGRAM(S): 5 TABLET ORAL at 01:42

## 2017-04-12 RX ADMIN — LISINOPRIL 10 MILLIGRAM(S): 2.5 TABLET ORAL at 11:51

## 2017-04-12 RX ADMIN — Medication 50 MILLIGRAM(S): at 06:13

## 2017-04-12 RX ADMIN — SODIUM CHLORIDE 3 MILLILITER(S): 9 INJECTION INTRAMUSCULAR; INTRAVENOUS; SUBCUTANEOUS at 21:43

## 2017-04-12 RX ADMIN — CLOPIDOGREL BISULFATE 75 MILLIGRAM(S): 75 TABLET, FILM COATED ORAL at 11:52

## 2017-04-12 RX ADMIN — WARFARIN SODIUM 5 MILLIGRAM(S): 2.5 TABLET ORAL at 21:45

## 2017-04-12 RX ADMIN — OXYCODONE HYDROCHLORIDE 10 MILLIGRAM(S): 5 TABLET ORAL at 02:45

## 2017-04-12 RX ADMIN — SODIUM CHLORIDE 3 MILLILITER(S): 9 INJECTION INTRAMUSCULAR; INTRAVENOUS; SUBCUTANEOUS at 13:39

## 2017-04-12 RX ADMIN — OXYCODONE HYDROCHLORIDE 5 MILLIGRAM(S): 5 TABLET ORAL at 17:24

## 2017-04-12 RX ADMIN — Medication 2 UNIT(S): at 08:40

## 2017-04-12 RX ADMIN — Medication 50 MILLIGRAM(S): at 17:24

## 2017-04-12 RX ADMIN — Medication 100 MILLIGRAM(S): at 21:45

## 2017-04-12 RX ADMIN — Medication 250 MILLIGRAM(S): at 08:40

## 2017-04-12 NOTE — PROGRESS NOTE ADULT - SUBJECTIVE AND OBJECTIVE BOX
Subjective: no events overnight, no complaints    T(C): 36.6, Max: 36.6 (04-11 @ 20:00)  HR: 87 (80 - 97)  BP: 163/69 (131/62 - 163/69)  ABP: 185/54 (155/46 - 196/65)  ABP(mean): 92 (81 - 109)  RR: 30 (12 - 36)  SpO2: 99% (93% - 99%)  CVP(mm Hg): 8 (0 - 19)    04-11    140  |  105  |  21.0<H>  ----------------------------<  108  4.6   |  22.0  |  0.93    Ca    8.7      11 Apr 2017 04:16  Phos  3.7     04-11  Mg     2.0     04-11    TPro  5.3<L>  /  Alb  3.1<L>  /  TBili  0.5  /  DBili  0.1  /  AST  34<H>  /  ALT  11  /  AlkPhos  38<L>  04-11                               11.4   12.7  )-----------( 135      ( 11 Apr 2017 04:16 )             34.5        PT/INR - ( 11 Apr 2017 04:16 )   PT: 11.7 sec;   INR: 1.06 ratio         PTT - ( 11 Apr 2017 04:16 )  PTT:26.8 sec  ABG - ( 10 Apr 2017 21:50 )  pH: 7.35  /  pCO2: 42    /  pO2: 118   / HCO3: 22    / Base Excess: -2.5  /  SaO2: 99        CAPILLARY BLOOD GLUCOSE  145 (11 Apr 2017 16:00)  151 (11 Apr 2017 11:00)  160 (11 Apr 2017 09:20)  124 (11 Apr 2017 08:00)  143 (11 Apr 2017 07:14)  127 (11 Apr 2017 06:00)  101 (11 Apr 2017 05:00)  108 (11 Apr 2017 04:00)    CXR: pending     I&O's Detail  I & Os for 24h ending 11 Apr 2017 07:00  =============================================  IN:    Lactated Ringers IV Bolus: 1000 ml    Albumin 5%  - 250 mL: 250 ml    Solution: 250 ml    Oral Fluid: 240 ml    Solution: 200 ml    sodium chloride 0.9%.: 190 ml    Solution: 100 ml    Solution: 100 ml    sodium chloride 0.9%.: 95 ml    insulin Infusion: 30 ml    Total IN: 2455 ml  ---------------------------------------------  OUT:    Indwelling Catheter - Urethral: 1140 ml    Chest Tube: 210 ml    Chest Tube: 160 ml    Total OUT: 1510 ml  ---------------------------------------------  Total NET: 945 ml    I & Os for current day (as of 12 Apr 2017 03:03)  =============================================  IN:    Solution: 500 ml    Oral Fluid: 360 ml    sodium chloride 0.9%.: 200 ml    Solution: 200 ml    sodium chloride 0.9%.: 100 ml    insulin Infusion: 3 ml    Total IN: 1363 ml  ---------------------------------------------  OUT:    Indwelling Catheter - Urethral: 1890 ml    Chest Tube: 140 ml    Chest Tube: 80 ml    Total OUT: 2110 ml  ---------------------------------------------  Total NET: -747 ml    Drug Dosing Weight  Height (cm): 152.4 (09 Apr 2017 23:31)  Weight (kg): 60.8 (09 Apr 2017 23:31)  BMI (kg/m2): 26.2 (09 Apr 2017 23:31)  BSA (m2): 1.57 (09 Apr 2017 23:31)    MEDICATIONS  (STANDING):  aspirin enteric coated 325milliGRAM(s) Oral daily  vancomycin  IVPB 1000milliGRAM(s) IV Intermittent every 12 hours  atorvastatin 40milliGRAM(s) Oral at bedtime  docusate sodium 100milliGRAM(s) Oral three times a day  pantoprazole    Tablet 40milliGRAM(s) Oral before breakfast  clopidogrel Tablet 75milliGRAM(s) Oral daily  sodium chloride 0.9%. 1000milliLiter(s) IV Continuous <Continuous>  sodium chloride 0.9%. 1000milliLiter(s) IV Continuous <Continuous>  enoxaparin Injectable 40milliGRAM(s) SubCutaneous daily  insulin lispro Injectable (HumaLOG) 2Unit(s) SubCutaneous three times a day before meals  dextrose 5%. 1000milliLiter(s) IV Continuous <Continuous>  dextrose 50% Injectable 12.5Gram(s) IV Push once  dextrose 50% Injectable 25Gram(s) IV Push once  dextrose 50% Injectable 25Gram(s) IV Push once  metoprolol 25milliGRAM(s) Oral every 8 hours  acetaminophen  IVPB. 1000milliGRAM(s) IV Intermittent once    MEDICATIONS  (PRN):  dextrose Gel 1Dose(s) Oral once PRN Blood Glucose LESS THAN 70 milliGRAM(s)/deciLiter  glucagon  Injectable 1milliGRAM(s) IntraMuscular once PRN Glucose <70 milliGRAM(s)/deciLiter  oxyCODONE IR 5milliGRAM(s) Oral every 4 hours PRN Moderate Pain (4 - 6)  oxyCODONE IR 10milliGRAM(s) Oral every 4 hours PRN Severe Pain (7 - 10)    Physical Exam  Neuro: A&Ox3  Pulm: decreased b/l   CV: S1S2 RRR  Abd: + BS soft NT Nd  Extrem/MS: trace b/l LE edema, ACE wrap RLE intact  Incision(s): mid sternal dsg C/D/I

## 2017-04-13 DIAGNOSIS — K59.00 CONSTIPATION, UNSPECIFIED: ICD-10-CM

## 2017-04-13 DIAGNOSIS — G89.18 OTHER ACUTE POSTPROCEDURAL PAIN: ICD-10-CM

## 2017-04-13 DIAGNOSIS — Z29.9 ENCOUNTER FOR PROPHYLACTIC MEASURES, UNSPECIFIED: ICD-10-CM

## 2017-04-13 DIAGNOSIS — Z99.81 DEPENDENCE ON SUPPLEMENTAL OXYGEN: ICD-10-CM

## 2017-04-13 DIAGNOSIS — R60.9 EDEMA, UNSPECIFIED: ICD-10-CM

## 2017-04-13 LAB
ANION GAP SERPL CALC-SCNC: 13 MMOL/L — SIGNIFICANT CHANGE UP (ref 5–17)
BUN SERPL-MCNC: 19 MG/DL — SIGNIFICANT CHANGE UP (ref 8–20)
CALCIUM SERPL-MCNC: 9.1 MG/DL — SIGNIFICANT CHANGE UP (ref 8.6–10.2)
CHLORIDE SERPL-SCNC: 103 MMOL/L — SIGNIFICANT CHANGE UP (ref 98–107)
CO2 SERPL-SCNC: 27 MMOL/L — SIGNIFICANT CHANGE UP (ref 22–29)
CREAT SERPL-MCNC: 0.89 MG/DL — SIGNIFICANT CHANGE UP (ref 0.5–1.3)
GLUCOSE SERPL-MCNC: 140 MG/DL — HIGH (ref 70–115)
HCT VFR BLD CALC: 33.2 % — LOW (ref 37–47)
HGB BLD-MCNC: 10.6 G/DL — LOW (ref 12–16)
INR BLD: 1.06 RATIO — SIGNIFICANT CHANGE UP (ref 0.88–1.16)
MAGNESIUM SERPL-MCNC: 1.9 MG/DL — SIGNIFICANT CHANGE UP (ref 1.8–2.5)
MCHC RBC-ENTMCNC: 27.2 PG — SIGNIFICANT CHANGE UP (ref 27–31)
MCHC RBC-ENTMCNC: 31.9 G/DL — LOW (ref 32–36)
MCV RBC AUTO: 85.1 FL — SIGNIFICANT CHANGE UP (ref 81–99)
PHOSPHATE SERPL-MCNC: 2.6 MG/DL — SIGNIFICANT CHANGE UP (ref 2.4–4.7)
PLATELET # BLD AUTO: 157 K/UL — SIGNIFICANT CHANGE UP (ref 150–400)
POTASSIUM SERPL-MCNC: 4.8 MMOL/L — SIGNIFICANT CHANGE UP (ref 3.5–5.3)
POTASSIUM SERPL-SCNC: 4.8 MMOL/L — SIGNIFICANT CHANGE UP (ref 3.5–5.3)
PROTHROM AB SERPL-ACNC: 11.7 SEC — SIGNIFICANT CHANGE UP (ref 9.8–12.7)
RBC # BLD: 3.9 M/UL — LOW (ref 4.4–5.2)
RBC # FLD: 16 % — HIGH (ref 11–15.6)
SODIUM SERPL-SCNC: 143 MMOL/L — SIGNIFICANT CHANGE UP (ref 135–145)
WBC # BLD: 10.8 K/UL — SIGNIFICANT CHANGE UP (ref 4.8–10.8)
WBC # FLD AUTO: 10.8 K/UL — SIGNIFICANT CHANGE UP (ref 4.8–10.8)

## 2017-04-13 PROCEDURE — 71010: CPT | Mod: 26

## 2017-04-13 RX ORDER — MAGNESIUM SULFATE 500 MG/ML
2 VIAL (ML) INJECTION ONCE
Qty: 0 | Refills: 0 | Status: COMPLETED | OUTPATIENT
Start: 2017-04-13 | End: 2017-04-13

## 2017-04-13 RX ORDER — FUROSEMIDE 40 MG
40 TABLET ORAL DAILY
Qty: 0 | Refills: 0 | Status: DISCONTINUED | OUTPATIENT
Start: 2017-04-13 | End: 2017-04-15

## 2017-04-13 RX ORDER — POTASSIUM CHLORIDE 20 MEQ
20 PACKET (EA) ORAL DAILY
Qty: 0 | Refills: 0 | Status: DISCONTINUED | OUTPATIENT
Start: 2017-04-13 | End: 2017-04-15

## 2017-04-13 RX ORDER — ASCORBIC ACID 60 MG
500 TABLET,CHEWABLE ORAL
Qty: 0 | Refills: 0 | Status: DISCONTINUED | OUTPATIENT
Start: 2017-04-13 | End: 2017-04-16

## 2017-04-13 RX ORDER — SENNA PLUS 8.6 MG/1
2 TABLET ORAL AT BEDTIME
Qty: 0 | Refills: 0 | Status: DISCONTINUED | OUTPATIENT
Start: 2017-04-13 | End: 2017-04-16

## 2017-04-13 RX ORDER — WARFARIN SODIUM 2.5 MG/1
5 TABLET ORAL ONCE
Qty: 0 | Refills: 0 | Status: COMPLETED | OUTPATIENT
Start: 2017-04-13 | End: 2017-04-13

## 2017-04-13 RX ORDER — MAGNESIUM HYDROXIDE 400 MG/1
30 TABLET, CHEWABLE ORAL DAILY
Qty: 0 | Refills: 0 | Status: DISCONTINUED | OUTPATIENT
Start: 2017-04-13 | End: 2017-04-16

## 2017-04-13 RX ADMIN — LISINOPRIL 10 MILLIGRAM(S): 2.5 TABLET ORAL at 05:33

## 2017-04-13 RX ADMIN — CLOPIDOGREL BISULFATE 75 MILLIGRAM(S): 75 TABLET, FILM COATED ORAL at 12:05

## 2017-04-13 RX ADMIN — Medication 50 MILLIGRAM(S): at 18:07

## 2017-04-13 RX ADMIN — SODIUM CHLORIDE 3 MILLILITER(S): 9 INJECTION INTRAMUSCULAR; INTRAVENOUS; SUBCUTANEOUS at 13:38

## 2017-04-13 RX ADMIN — SODIUM CHLORIDE 3 MILLILITER(S): 9 INJECTION INTRAMUSCULAR; INTRAVENOUS; SUBCUTANEOUS at 05:33

## 2017-04-13 RX ADMIN — Medication 40 MILLIGRAM(S): at 12:05

## 2017-04-13 RX ADMIN — Medication 50 MILLIGRAM(S): at 05:33

## 2017-04-13 RX ADMIN — Medication 100 MILLIGRAM(S): at 21:24

## 2017-04-13 RX ADMIN — OXYCODONE HYDROCHLORIDE 5 MILLIGRAM(S): 5 TABLET ORAL at 03:09

## 2017-04-13 RX ADMIN — SODIUM CHLORIDE 3 MILLILITER(S): 9 INJECTION INTRAMUSCULAR; INTRAVENOUS; SUBCUTANEOUS at 21:22

## 2017-04-13 RX ADMIN — SENNA PLUS 2 TABLET(S): 8.6 TABLET ORAL at 21:24

## 2017-04-13 RX ADMIN — Medication 100 MILLIGRAM(S): at 05:33

## 2017-04-13 RX ADMIN — ATORVASTATIN CALCIUM 40 MILLIGRAM(S): 80 TABLET, FILM COATED ORAL at 21:24

## 2017-04-13 RX ADMIN — Medication 50 GRAM(S): at 12:04

## 2017-04-13 RX ADMIN — Medication 100 MILLIGRAM(S): at 13:38

## 2017-04-13 RX ADMIN — Medication 325 MILLIGRAM(S): at 12:05

## 2017-04-13 RX ADMIN — ENOXAPARIN SODIUM 40 MILLIGRAM(S): 100 INJECTION SUBCUTANEOUS at 12:04

## 2017-04-13 RX ADMIN — WARFARIN SODIUM 5 MILLIGRAM(S): 2.5 TABLET ORAL at 21:24

## 2017-04-13 RX ADMIN — Medication 500 MILLIGRAM(S): at 18:07

## 2017-04-13 RX ADMIN — OXYCODONE HYDROCHLORIDE 5 MILLIGRAM(S): 5 TABLET ORAL at 23:26

## 2017-04-13 RX ADMIN — PANTOPRAZOLE SODIUM 40 MILLIGRAM(S): 20 TABLET, DELAYED RELEASE ORAL at 05:33

## 2017-04-13 RX ADMIN — OXYCODONE HYDROCHLORIDE 5 MILLIGRAM(S): 5 TABLET ORAL at 03:40

## 2017-04-13 RX ADMIN — Medication 20 MILLIEQUIVALENT(S): at 12:05

## 2017-04-13 NOTE — PROGRESS NOTE ADULT - SUBJECTIVE AND OBJECTIVE BOX
Subjective: "I am good." Denies CP, SOB. States she ambulated with her cane and physical therapy. Patient also states she lives alone but has a lot of family nearby (daughter and 4 grandsons).    VITAL SIGNS  Vital Signs Last 24 Hrs  T(C): 36.8, Max: 37 (04-12 @ 12:00)  T(F): 98.3, Max: 98.6 (04-12 @ 12:00)  HR: 87 (87 - 102)  BP: 134/64 (134/64 - 179/84)  RR: 17 (16 - 28)  SpO2: 98% (93% - 98%) on 1.5 L NC         Telemetry/Alarms:   SR   LVEF: 60%    MEDICATIONS  aspirin enteric coated 325milliGRAM(s) Oral daily  atorvastatin 40milliGRAM(s) Oral at bedtime  docusate sodium 100milliGRAM(s) Oral three times a day  pantoprazole    Tablet 40milliGRAM(s) Oral before breakfast  clopidogrel Tablet 75milliGRAM(s) Oral daily  enoxaparin Injectable 40milliGRAM(s) SubCutaneous daily  acetaminophen  IVPB. 1000milliGRAM(s) IV Intermittent once  oxyCODONE IR 5milliGRAM(s) Oral every 4 hours PRN  metoprolol 50milliGRAM(s) Oral two times a day  sodium chloride 0.9% lock flush 3milliLiter(s) IV Push every 8 hours  lisinopril 10milliGRAM(s) Oral daily  warfarin 5milliGRAM(s) Oral once  furosemide    Tablet 40milliGRAM(s) Oral daily  potassium chloride    Tablet ER 20milliEquivalent(s) Oral daily  magnesium sulfate  IVPB 2Gram(s) IV Intermittent once  ascorbic acid 500milliGRAM(s) Oral two times a day      PHYSICAL EXAM  General: well nourished, well developed, no acute distress  Neurology: alert and oriented x 3, nonfocal, no gross deficits  Respiratory: Left side rales, diminished right base.   CV: regular rate and rhythm, normal S1, S2  Abdomen: soft, nontender, nondistended, hypoactive bowel sounds, last bowel movement PREOP, minimal flatus  Extremities: warm, well perfused. 1+ edema. + DP pulses  Incisions: midline sternal incision, + mepilex, c/d/i. sternum stable. RLE + EVH incisions, more edema than left. Inc c/d/i. + ACE wrap reapplied foot to groin.  Epicardial Wires:  Pacing wires (1V) > isolated    I & Os for 24h ending  @ 07:00  =============================================  IN: 240 ml / OUT: 3870 ml / NET: -3630 ml    I & Os for current day (as of  @ 11:59)  =============================================  IN: 0 ml / OUT: 450 ml / NET: -450 ml      Weights:  Daily     Daily Weight in k.1 (2017 23:18)  Admit Wt: Drug Dosing Weight  Height (cm): 152.4 (2017 23:31)  Weight (kg): 60.8 (2017 23:31)  BMI (kg/m2): 26.2 (2017 23:31)  BSA (m2): 1.57 (2017 23:31)    LABS      143  |  103  |  19.0  ----------------------------<  140<H>  4.8   |  27.0  |  0.89    Ca    9.1      2017 06:59  Phos  2.6       Mg     1.9                                        10.6   10.8  )-----------( 157      ( 2017 06:59 )             33.2          PT/INR - ( 2017 06:59 )   PT: 11.7 sec;   INR: 1.06 ratio           Today's CXR:   small left effusion    Today's EKG:    PAST MEDICAL & SURGICAL HISTORY:  DVT (deep venous thrombosis)  Pulmonary embolus  ASHD (arteriosclerotic heart disease)  Angina pectoris  HLD (hyperlipidemia)  HTN (hypertension)  H/O foot surgery  H/O: hysterectomy  History of left cataract surgery  History of carpal tunnel release

## 2017-04-13 NOTE — PROGRESS NOTE ADULT - ASSESSMENT
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 CCS 3 stable angina.    Preop course: Negative nuclear stress test. Cath revealed severe 2VD.    On 4/10/17, pt underwent CABG x 2 with LIMA and SVG.    Postoperative Course/Issues: uncomplicated      PLAN  Daily PT/OT  Isolate pacing wires: remove tomorrow  Possible home Saturday  Care Coordination eval (patient lives alone, will need home care and ? other services)

## 2017-04-14 ENCOUNTER — TRANSCRIPTION ENCOUNTER (OUTPATIENT)
Age: 82
End: 2017-04-14

## 2017-04-14 LAB
ANION GAP SERPL CALC-SCNC: 13 MMOL/L — SIGNIFICANT CHANGE UP (ref 5–17)
BUN SERPL-MCNC: 29 MG/DL — HIGH (ref 8–20)
CALCIUM SERPL-MCNC: 9 MG/DL — SIGNIFICANT CHANGE UP (ref 8.6–10.2)
CHLORIDE SERPL-SCNC: 98 MMOL/L — SIGNIFICANT CHANGE UP (ref 98–107)
CO2 SERPL-SCNC: 27 MMOL/L — SIGNIFICANT CHANGE UP (ref 22–29)
CREAT SERPL-MCNC: 0.92 MG/DL — SIGNIFICANT CHANGE UP (ref 0.5–1.3)
GLUCOSE SERPL-MCNC: 112 MG/DL — SIGNIFICANT CHANGE UP (ref 70–115)
HCT VFR BLD CALC: 33.1 % — LOW (ref 37–47)
HGB BLD-MCNC: 10.9 G/DL — LOW (ref 12–16)
INR BLD: 1.22 RATIO — HIGH (ref 0.88–1.16)
MAGNESIUM SERPL-MCNC: 1.9 MG/DL — SIGNIFICANT CHANGE UP (ref 1.8–2.5)
MCHC RBC-ENTMCNC: 27.8 PG — SIGNIFICANT CHANGE UP (ref 27–31)
MCHC RBC-ENTMCNC: 32.9 G/DL — SIGNIFICANT CHANGE UP (ref 32–36)
MCV RBC AUTO: 84.4 FL — SIGNIFICANT CHANGE UP (ref 81–99)
PHOSPHATE SERPL-MCNC: 3.1 MG/DL — SIGNIFICANT CHANGE UP (ref 2.4–4.7)
PLATELET # BLD AUTO: 179 K/UL — SIGNIFICANT CHANGE UP (ref 150–400)
POTASSIUM SERPL-MCNC: 4 MMOL/L — SIGNIFICANT CHANGE UP (ref 3.5–5.3)
POTASSIUM SERPL-SCNC: 4 MMOL/L — SIGNIFICANT CHANGE UP (ref 3.5–5.3)
PROTHROM AB SERPL-ACNC: 13.5 SEC — HIGH (ref 9.8–12.7)
RBC # BLD: 3.92 M/UL — LOW (ref 4.4–5.2)
RBC # FLD: 15.7 % — HIGH (ref 11–15.6)
SODIUM SERPL-SCNC: 138 MMOL/L — SIGNIFICANT CHANGE UP (ref 135–145)
WBC # BLD: 9.6 K/UL — SIGNIFICANT CHANGE UP (ref 4.8–10.8)
WBC # FLD AUTO: 9.6 K/UL — SIGNIFICANT CHANGE UP (ref 4.8–10.8)

## 2017-04-14 PROCEDURE — 93010 ELECTROCARDIOGRAM REPORT: CPT | Mod: 77

## 2017-04-14 PROCEDURE — 93010 ELECTROCARDIOGRAM REPORT: CPT

## 2017-04-14 PROCEDURE — 71010: CPT | Mod: 26

## 2017-04-14 RX ORDER — MAGNESIUM SULFATE 500 MG/ML
2 VIAL (ML) INJECTION ONCE
Qty: 0 | Refills: 0 | Status: COMPLETED | OUTPATIENT
Start: 2017-04-14 | End: 2017-04-14

## 2017-04-14 RX ORDER — SORBITOL SOLUTION 70 %
30 SOLUTION, ORAL MISCELLANEOUS ONCE
Qty: 0 | Refills: 0 | Status: DISCONTINUED | OUTPATIENT
Start: 2017-04-14 | End: 2017-04-16

## 2017-04-14 RX ORDER — AMIODARONE HYDROCHLORIDE 400 MG/1
400 TABLET ORAL EVERY 8 HOURS
Qty: 0 | Refills: 0 | Status: DISCONTINUED | OUTPATIENT
Start: 2017-04-14 | End: 2017-04-16

## 2017-04-14 RX ORDER — METOPROLOL TARTRATE 50 MG
50 TABLET ORAL ONCE
Qty: 0 | Refills: 0 | Status: DISCONTINUED | OUTPATIENT
Start: 2017-04-14 | End: 2017-04-14

## 2017-04-14 RX ORDER — METOPROLOL TARTRATE 50 MG
100 TABLET ORAL
Qty: 0 | Refills: 0 | Status: DISCONTINUED | OUTPATIENT
Start: 2017-04-14 | End: 2017-04-14

## 2017-04-14 RX ORDER — AMIODARONE HYDROCHLORIDE 400 MG/1
200 TABLET ORAL DAILY
Qty: 0 | Refills: 0 | Status: CANCELLED | OUTPATIENT
Start: 2017-04-18 | End: 2017-04-16

## 2017-04-14 RX ORDER — WARFARIN SODIUM 2.5 MG/1
5 TABLET ORAL ONCE
Qty: 0 | Refills: 0 | Status: COMPLETED | OUTPATIENT
Start: 2017-04-14 | End: 2017-04-14

## 2017-04-14 RX ADMIN — Medication 500 MILLIGRAM(S): at 05:21

## 2017-04-14 RX ADMIN — ENOXAPARIN SODIUM 40 MILLIGRAM(S): 100 INJECTION SUBCUTANEOUS at 13:21

## 2017-04-14 RX ADMIN — CLOPIDOGREL BISULFATE 75 MILLIGRAM(S): 75 TABLET, FILM COATED ORAL at 13:21

## 2017-04-14 RX ADMIN — LISINOPRIL 10 MILLIGRAM(S): 2.5 TABLET ORAL at 05:22

## 2017-04-14 RX ADMIN — SODIUM CHLORIDE 3 MILLILITER(S): 9 INJECTION INTRAMUSCULAR; INTRAVENOUS; SUBCUTANEOUS at 05:24

## 2017-04-14 RX ADMIN — AMIODARONE HYDROCHLORIDE 400 MILLIGRAM(S): 400 TABLET ORAL at 09:26

## 2017-04-14 RX ADMIN — PANTOPRAZOLE SODIUM 40 MILLIGRAM(S): 20 TABLET, DELAYED RELEASE ORAL at 05:21

## 2017-04-14 RX ADMIN — Medication 100 MILLIGRAM(S): at 21:57

## 2017-04-14 RX ADMIN — ATORVASTATIN CALCIUM 40 MILLIGRAM(S): 80 TABLET, FILM COATED ORAL at 21:57

## 2017-04-14 RX ADMIN — AMIODARONE HYDROCHLORIDE 400 MILLIGRAM(S): 400 TABLET ORAL at 17:38

## 2017-04-14 RX ADMIN — Medication 50 MILLIGRAM(S): at 05:21

## 2017-04-14 RX ADMIN — OXYCODONE HYDROCHLORIDE 5 MILLIGRAM(S): 5 TABLET ORAL at 09:00

## 2017-04-14 RX ADMIN — WARFARIN SODIUM 5 MILLIGRAM(S): 2.5 TABLET ORAL at 21:57

## 2017-04-14 RX ADMIN — MAGNESIUM HYDROXIDE 30 MILLILITER(S): 400 TABLET, CHEWABLE ORAL at 05:49

## 2017-04-14 RX ADMIN — Medication 20 MILLIEQUIVALENT(S): at 13:22

## 2017-04-14 RX ADMIN — OXYCODONE HYDROCHLORIDE 5 MILLIGRAM(S): 5 TABLET ORAL at 08:15

## 2017-04-14 RX ADMIN — Medication 500 MILLIGRAM(S): at 17:38

## 2017-04-14 RX ADMIN — Medication 100 MILLIGRAM(S): at 13:22

## 2017-04-14 RX ADMIN — Medication 100 MILLIGRAM(S): at 05:21

## 2017-04-14 RX ADMIN — SODIUM CHLORIDE 3 MILLILITER(S): 9 INJECTION INTRAMUSCULAR; INTRAVENOUS; SUBCUTANEOUS at 21:39

## 2017-04-14 RX ADMIN — Medication 325 MILLIGRAM(S): at 13:22

## 2017-04-14 RX ADMIN — Medication 40 MILLIGRAM(S): at 05:21

## 2017-04-14 RX ADMIN — OXYCODONE HYDROCHLORIDE 5 MILLIGRAM(S): 5 TABLET ORAL at 00:00

## 2017-04-14 RX ADMIN — SODIUM CHLORIDE 3 MILLILITER(S): 9 INJECTION INTRAMUSCULAR; INTRAVENOUS; SUBCUTANEOUS at 13:22

## 2017-04-14 RX ADMIN — Medication 50 GRAM(S): at 09:25

## 2017-04-14 RX ADMIN — AMIODARONE HYDROCHLORIDE 400 MILLIGRAM(S): 400 TABLET ORAL at 23:49

## 2017-04-14 NOTE — PROGRESS NOTE ADULT - SUBJECTIVE AND OBJECTIVE BOX
Subjective: "  I felt better yesterday.  Doing Ok this morning"  Pt in bed NAD    VITAL SIGNS  T(C): 36.2, Max: 36.8 (04-13 @ 10:31)  HR: 95 (85 - 104)  BP: 158/93 (134/64 - 167/76)  RR: 15 (15 - 18)  SpO2: 99% (94% - 99%) 2 L NC     Daily     Daily Weight in k.1 (2017 05:22)  Admit Wt: Drug Dosing Weight  Height (cm): 152.4 (2017 23:31)  Weight (kg): 60.8 (2017 23:31)  Telemetry:   SR 90s   LVEF:  60%    MEDICATIONS  aspirin enteric coated 325milliGRAM(s) Oral daily  atorvastatin 40milliGRAM(s) Oral at bedtime  docusate sodium 100milliGRAM(s) Oral three times a day  pantoprazole    Tablet 40milliGRAM(s) Oral before breakfast  clopidogrel Tablet 75milliGRAM(s) Oral daily  enoxaparin Injectable 40milliGRAM(s) SubCutaneous daily  acetaminophen  IVPB. 1000milliGRAM(s) IV Intermittent once  oxyCODONE IR 5milliGRAM(s) Oral every 4 hours PRN  metoprolol 50milliGRAM(s) Oral two times a day  sodium chloride 0.9% lock flush 3milliLiter(s) IV Push every 8 hours  lisinopril 10milliGRAM(s) Oral daily  furosemide    Tablet 40milliGRAM(s) Oral daily  potassium chloride    Tablet ER 20milliEquivalent(s) Oral daily  ascorbic acid 500milliGRAM(s) Oral two times a day  senna 2Tablet(s) Oral at bedtime  magnesium hydroxide Suspension 30milliLiter(s) Oral daily PRN  bisacodyl Suppository 10milliGRAM(s) Rectal daily PRN  magnesium sulfate  IVPB 2Gram(s) IV Intermittent once  warfarin 5milliGRAM(s) Oral once  sorbitol 70% Solution 30milliLiter(s) Oral once    MEDICATIONS  (PRN):  oxyCODONE IR 5milliGRAM(s) Oral every 4 hours PRN Moderate Pain (4 - 6)  magnesium hydroxide Suspension 30milliLiter(s) Oral daily PRN Constipation  bisacodyl Suppository 10milliGRAM(s) Rectal daily PRN Constipation        PHYSICAL EXAM  General: alert awake no deficits   Respiratory: clesr, decreased at base b/l   CV: RRR S1 S2   Abdomen:  soft NT,ND + BS - BM  Extremities: trace edema b/l LE + ACE wrap Rt leg   Incisions: MSI + Mepilex C/D/I  stable sternum       I&O's Detail    I & Os for current day (as of 2017 08:35)  =============================================  IN:    Oral Fluid: 270 ml    Total IN: 270 ml  ---------------------------------------------  OUT:    Voided: 1250 ml    Total OUT: 1250 ml  ---------------------------------------------  Total NET: -980 ml      LABS  -    138  |  98  |  29.0<H>  ----------------------------<  112  4.0   |  27.0  |  0.92    Ca    9.0      2017 05:26  Phos  3.1     04-14  Mg     1.9     04-14                                   10.9   9.6   )-----------( 179      ( 2017 05:26 )             33.1          PT/INR - ( 2017 05:26 )   PT: 13.5 sec;   INR: 1.22 ratio                    CAPILLARY BLOOD GLUCOSE           Today's CXR: left side atelectasis,  mild PVC b/l      PAST MEDICAL & SURGICAL HISTORY:  DVT (deep venous thrombosis)  Pulmonary embolus  ASHD (arteriosclerotic heart disease)  Angina pectoris  HLD (hyperlipidemia)  HTN (hypertension)  H/O foot surgery  H/O: hysterectomy  History of left cataract surgery  History of carpal tunnel release

## 2017-04-14 NOTE — DISCHARGE NOTE ADULT - ADDITIONAL INSTRUCTIONS
Please have your PT/INR levels checked on Mondays  and then on   Thursdays and then weekly, please have all blood work fax to Dr. Slade  and follow up with Dr. Kendall  for your coumadin dosage.

## 2017-04-14 NOTE — DISCHARGE NOTE ADULT - PATIENT PORTAL LINK FT
“You can access the [facility] Patient Portal, offered by [facility], by registering with the following website: [email or web address]”

## 2017-04-14 NOTE — DISCHARGE NOTE ADULT - HOSPITAL COURSE
88y Female with a signficant PMHx of DVT/PEs on Coumadin, HTN, hyperlipidemia, was admitted on 4/7/17 from home with c/o worsening back discomfort radiating to her right arm with pre-syncopal episode, mild nausea, diaphoresis and dyspnea for elective cardiac catheterization. CCS 3 stable angina.    Preop course: Negative nuclear stress test. Cath revealed severe 2VD.    On 4/10/17, pt underwent CABG x 2 with LIMA and SVG.    Postoperative Course/Issues: transient Afib> amio load started w/ conversion NSR

## 2017-04-14 NOTE — DISCHARGE NOTE ADULT - CARE PROVIDER_API CALL
Donnie Griffin), Surgery; Thoracic and Cardiac Surgery  [address] [place] [address]  Phone: [phone]  Fax: [phone]    Wyatt Mcclain (), Cardiology; Internal Medicine; Interventional Cardiology  [address] [place] [address]  Phone: [phone]  Fax: [phone]

## 2017-04-14 NOTE — CHART NOTE - NSCHARTNOTEFT_GEN_A_CORE
CTS    Asked to see pt by SUSAN Buck because pt reported feeling dizzy after trying to have a BM.  Pt was noted to be in CELIA in the 160s.  BP was 90/50.  Pt was placed back in bed , cardizem 10 mg IVP was given,  HR went down to 100s.  Amiodarone load started.  Pt reported after her HR went down, the dizziness subsided.

## 2017-04-14 NOTE — DISCHARGE NOTE ADULT - MEDICATION SUMMARY - MEDICATIONS TO TAKE
I will START or STAY ON the medications listed below when I get home from the hospital:    b12  -- 1000 microgram(s)  once a day  -- Indication: For supplement    calcium  -- 300 milligram(s)  once a day  -- Indication: For supplement    aspirin 81 mg oral tablet  -- 1 tab(s) by mouth once a day  -- Indication: For ASpirin    acetaminophen-oxyCODONE 325 mg-5 mg oral tablet  -- 1 tab(s) by mouth every 4 hours, As needed, Moderate Pain (4 - 6) MDD:4 tablets  -- Indication: For Pain, acute postoperative    amiodarone 200 mg oral tablet  -- 1 tab(s) by mouth once a day  -- Indication: For rhythm regulation for 30 days    warfarin 5 mg oral tablet  -- 1 tab(s) by mouth once a day  -- Indication: For Anticoagulation    atorvastatin 10 mg oral tablet  -- 1 tab(s) by mouth once a day  -- Indication: For statin    clopidogrel 75 mg oral tablet  -- 1 tab(s) by mouth once a day  -- Indication: For Antiplatelet    metoprolol tartrate 50 mg oral tablet  -- 1 tab(s) by mouth 2 times a day  -- Indication: For betablocker    senna oral tablet  -- 2 tab(s) by mouth once a day (at bedtime)  -- Indication: For stool softener    pantoprazole 40 mg oral delayed release tablet  -- 1 tab(s) by mouth once a day (before a meal)  -- Indication: For GERD    ascorbic acid 500 mg oral tablet  -- 1 tab(s) by mouth 2 times a day  -- Indication: For supplement

## 2017-04-14 NOTE — DISCHARGE NOTE ADULT - CARE PLAN
Principal Discharge DX:	S/P CABG x 2  Goal:	recover from surgery  Instructions for follow-up, activity and diet:	Follow up appointment with Dr Griffin needed 3 weeks  Cardiac surgery office second floor at [facility]  Call Marian [phone] Monday to arrange  Wash incisions with soap and water using washcloth in shower daily  Please come to ED or Call Cardio thoracic office at [phone] if Chest pain, Shortness of Breath, persistant Nausea & vomiting, oozing from wounds,palpitations or pain not relieved with medication  Weigh yourself daily>notify surgeons office if  2 lb increase in weight in 24 hours.  Shower daily, no bathing swimming in a pool or the ocean until instructed by MD.    We advise that you do not drive until instructed by MD.   You may not return to work until instructed by MD.   DO NOT APPLY CREAM POWDER LOTION OR OINTMENT TO ANY SURGICAL WOUND>THIS CAN IMPEDE HEALING AND CAUSE AN INFECTION    Please eat a low fat low salt diet. Principal Discharge DX:	S/P CABG x 2  Goal:	recover from surgery  Instructions for follow-up, activity and diet:	Follow up appointment with Dr Griffin needed 3 weeks  Cardiac surgery office second floor at [facility]  Call Marian [phone] Monday to arrange  Wash incisions with soap and water using washcloth in shower daily  Please come to ED or Call Cardio thoracic office at [phone] if Chest pain, Shortness of Breath, persistant Nausea & vomiting, oozing from wounds,palpitations or pain not relieved with medication  Weigh yourself daily>notify surgeons office if  2 lb increase in weight in 24 hours.  Shower daily, no bathing swimming in a pool or the ocean until instructed by MD.    We advise that you do not drive until instructed by MD.   You may not return to work until instructed by MD.   DO NOT APPLY CREAM POWDER LOTION OR OINTMENT TO ANY SURGICAL WOUND>THIS CAN IMPEDE HEALING AND CAUSE AN INFECTION    Please eat a low fat low salt diet.

## 2017-04-14 NOTE — DISCHARGE NOTE ADULT - MEDICATION SUMMARY - MEDICATIONS TO CHANGE
I will SWITCH the dose or number of times a day I take the medications listed below when I get home from the hospital:    metoprolol tartrate 100 mg oral tablet  --  by mouth once a day

## 2017-04-14 NOTE — DISCHARGE NOTE ADULT - CARE PROVIDERS DIRECT ADDRESSES
[email or web address],[email or web address],DirectAddress_Unknown

## 2017-04-14 NOTE — PROGRESS NOTE ADULT - ASSESSMENT
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 CCS 3 stable angina.    Preop course: Negative nuclear stress test. Cath revealed severe 2VD.    On 4/10/17, pt underwent CABG x 2 with LIMA and SVG.    Postoperative Course/Issues: uncomplicated      PLAN  Daily PT/OT  d/c pacing wires  Possible home Saturday  Care Coordination eval (patient lives alone, will need home care and ? other services)

## 2017-04-14 NOTE — DISCHARGE NOTE ADULT - NS AS ACTIVITY OBS
Showering allowed/Bathing allowed/Walking-Indoors allowed/Do not drive or operate machinery/Stairs allowed/Walking-Outdoors allowed/No Heavy lifting/straining/Do not make important decisions

## 2017-04-14 NOTE — DISCHARGE NOTE ADULT - PLAN OF CARE
recover from surgery Follow up appointment with Dr Griffin needed 3 weeks  Cardiac surgery office second floor at [facility]  Call Marian [phone] Monday to arrange  Wash incisions with soap and water using washcloth in shower daily  Please come to ED or Call Cardio thoracic office at [phone] if Chest pain, Shortness of Breath, persistant Nausea & vomiting, oozing from wounds,palpitations or pain not relieved with medication  Weigh yourself daily>notify surgeons office if  2 lb increase in weight in 24 hours.  Shower daily, no bathing swimming in a pool or the ocean until instructed by MD.    We advise that you do not drive until instructed by MD.   You may not return to work until instructed by MD.   DO NOT APPLY CREAM POWDER LOTION OR OINTMENT TO ANY SURGICAL WOUND>THIS CAN IMPEDE HEALING AND CAUSE AN INFECTION    Please eat a low fat low salt diet.

## 2017-04-15 DIAGNOSIS — I48.91 UNSPECIFIED ATRIAL FIBRILLATION: ICD-10-CM

## 2017-04-15 LAB
ALBUMIN SERPL ELPH-MCNC: 3 G/DL — LOW (ref 3.3–5.2)
ALP SERPL-CCNC: 50 U/L — SIGNIFICANT CHANGE UP (ref 40–120)
ALT FLD-CCNC: 17 U/L — SIGNIFICANT CHANGE UP
ANION GAP SERPL CALC-SCNC: 13 MMOL/L — SIGNIFICANT CHANGE UP (ref 5–17)
ANION GAP SERPL CALC-SCNC: 14 MMOL/L — SIGNIFICANT CHANGE UP (ref 5–17)
APTT BLD: 26.4 SEC — LOW (ref 27.5–37.4)
AST SERPL-CCNC: 23 U/L — SIGNIFICANT CHANGE UP
BILIRUB SERPL-MCNC: 0.5 MG/DL — SIGNIFICANT CHANGE UP (ref 0.4–2)
BUN SERPL-MCNC: 41 MG/DL — HIGH (ref 8–20)
BUN SERPL-MCNC: 44 MG/DL — HIGH (ref 8–20)
CALCIUM SERPL-MCNC: 8.9 MG/DL — SIGNIFICANT CHANGE UP (ref 8.6–10.2)
CALCIUM SERPL-MCNC: 9 MG/DL — SIGNIFICANT CHANGE UP (ref 8.6–10.2)
CHLORIDE SERPL-SCNC: 96 MMOL/L — LOW (ref 98–107)
CHLORIDE SERPL-SCNC: 99 MMOL/L — SIGNIFICANT CHANGE UP (ref 98–107)
CO2 SERPL-SCNC: 25 MMOL/L — SIGNIFICANT CHANGE UP (ref 22–29)
CO2 SERPL-SCNC: 28 MMOL/L — SIGNIFICANT CHANGE UP (ref 22–29)
CREAT SERPL-MCNC: 1.12 MG/DL — SIGNIFICANT CHANGE UP (ref 0.5–1.3)
CREAT SERPL-MCNC: 1.18 MG/DL — SIGNIFICANT CHANGE UP (ref 0.5–1.3)
GLUCOSE SERPL-MCNC: 111 MG/DL — SIGNIFICANT CHANGE UP (ref 70–115)
GLUCOSE SERPL-MCNC: 128 MG/DL — HIGH (ref 70–115)
HCT VFR BLD CALC: 32.4 % — LOW (ref 37–47)
HGB BLD-MCNC: 10.2 G/DL — LOW (ref 12–16)
INR BLD: 1.47 RATIO — HIGH (ref 0.88–1.16)
MAGNESIUM SERPL-MCNC: 3 MG/DL — HIGH (ref 1.8–2.5)
MCHC RBC-ENTMCNC: 27.1 PG — SIGNIFICANT CHANGE UP (ref 27–31)
MCHC RBC-ENTMCNC: 31.5 G/DL — LOW (ref 32–36)
MCV RBC AUTO: 86.2 FL — SIGNIFICANT CHANGE UP (ref 81–99)
PHOSPHATE SERPL-MCNC: 4.1 MG/DL — SIGNIFICANT CHANGE UP (ref 2.4–4.7)
PLATELET # BLD AUTO: 247 K/UL — SIGNIFICANT CHANGE UP (ref 150–400)
POTASSIUM SERPL-MCNC: 4.5 MMOL/L — SIGNIFICANT CHANGE UP (ref 3.5–5.3)
POTASSIUM SERPL-MCNC: 4.6 MMOL/L — SIGNIFICANT CHANGE UP (ref 3.5–5.3)
POTASSIUM SERPL-SCNC: 4.5 MMOL/L — SIGNIFICANT CHANGE UP (ref 3.5–5.3)
POTASSIUM SERPL-SCNC: 4.6 MMOL/L — SIGNIFICANT CHANGE UP (ref 3.5–5.3)
PROT SERPL-MCNC: 6.5 G/DL — LOW (ref 6.6–8.7)
PROTHROM AB SERPL-ACNC: 16.3 SEC — HIGH (ref 9.8–12.7)
RBC # BLD: 3.76 M/UL — LOW (ref 4.4–5.2)
RBC # FLD: 15.9 % — HIGH (ref 11–15.6)
SODIUM SERPL-SCNC: 137 MMOL/L — SIGNIFICANT CHANGE UP (ref 135–145)
SODIUM SERPL-SCNC: 138 MMOL/L — SIGNIFICANT CHANGE UP (ref 135–145)
WBC # BLD: 7 K/UL — SIGNIFICANT CHANGE UP (ref 4.8–10.8)
WBC # FLD AUTO: 7 K/UL — SIGNIFICANT CHANGE UP (ref 4.8–10.8)

## 2017-04-15 PROCEDURE — 71010: CPT | Mod: 26

## 2017-04-15 PROCEDURE — 93010 ELECTROCARDIOGRAM REPORT: CPT

## 2017-04-15 RX ORDER — WARFARIN SODIUM 2.5 MG/1
5 TABLET ORAL ONCE
Qty: 0 | Refills: 0 | Status: COMPLETED | OUTPATIENT
Start: 2017-04-15 | End: 2017-04-15

## 2017-04-15 RX ORDER — METOPROLOL TARTRATE 50 MG
25 TABLET ORAL ONCE
Qty: 0 | Refills: 0 | Status: COMPLETED | OUTPATIENT
Start: 2017-04-15 | End: 2017-04-15

## 2017-04-15 RX ORDER — ASPIRIN/CALCIUM CARB/MAGNESIUM 324 MG
1 TABLET ORAL
Qty: 0 | Refills: 0 | COMMUNITY

## 2017-04-15 RX ORDER — AMIODARONE HYDROCHLORIDE 400 MG/1
150 TABLET ORAL ONCE
Qty: 0 | Refills: 0 | Status: COMPLETED | OUTPATIENT
Start: 2017-04-15 | End: 2017-04-15

## 2017-04-15 RX ORDER — MAGNESIUM SULFATE 500 MG/ML
2 VIAL (ML) INJECTION ONCE
Qty: 0 | Refills: 0 | Status: COMPLETED | OUTPATIENT
Start: 2017-04-15 | End: 2017-04-15

## 2017-04-15 RX ORDER — METOPROLOL TARTRATE 50 MG
25 TABLET ORAL
Qty: 0 | Refills: 0 | Status: DISCONTINUED | OUTPATIENT
Start: 2017-04-15 | End: 2017-04-15

## 2017-04-15 RX ORDER — METOPROLOL TARTRATE 50 MG
50 TABLET ORAL
Qty: 0 | Refills: 0 | Status: DISCONTINUED | OUTPATIENT
Start: 2017-04-15 | End: 2017-04-16

## 2017-04-15 RX ADMIN — WARFARIN SODIUM 5 MILLIGRAM(S): 2.5 TABLET ORAL at 21:40

## 2017-04-15 RX ADMIN — Medication 100 MILLIGRAM(S): at 12:23

## 2017-04-15 RX ADMIN — LISINOPRIL 10 MILLIGRAM(S): 2.5 TABLET ORAL at 06:26

## 2017-04-15 RX ADMIN — SENNA PLUS 2 TABLET(S): 8.6 TABLET ORAL at 21:40

## 2017-04-15 RX ADMIN — PANTOPRAZOLE SODIUM 40 MILLIGRAM(S): 20 TABLET, DELAYED RELEASE ORAL at 05:25

## 2017-04-15 RX ADMIN — Medication 325 MILLIGRAM(S): at 12:23

## 2017-04-15 RX ADMIN — Medication 50 MILLIGRAM(S): at 17:21

## 2017-04-15 RX ADMIN — AMIODARONE HYDROCHLORIDE 400 MILLIGRAM(S): 400 TABLET ORAL at 13:17

## 2017-04-15 RX ADMIN — Medication 100 MILLIGRAM(S): at 21:40

## 2017-04-15 RX ADMIN — Medication 500 MILLIGRAM(S): at 05:25

## 2017-04-15 RX ADMIN — SODIUM CHLORIDE 3 MILLILITER(S): 9 INJECTION INTRAMUSCULAR; INTRAVENOUS; SUBCUTANEOUS at 13:15

## 2017-04-15 RX ADMIN — AMIODARONE HYDROCHLORIDE 618 MILLIGRAM(S): 400 TABLET ORAL at 02:13

## 2017-04-15 RX ADMIN — AMIODARONE HYDROCHLORIDE 400 MILLIGRAM(S): 400 TABLET ORAL at 21:40

## 2017-04-15 RX ADMIN — SODIUM CHLORIDE 3 MILLILITER(S): 9 INJECTION INTRAMUSCULAR; INTRAVENOUS; SUBCUTANEOUS at 05:29

## 2017-04-15 RX ADMIN — Medication 50 GRAM(S): at 00:34

## 2017-04-15 RX ADMIN — ATORVASTATIN CALCIUM 40 MILLIGRAM(S): 80 TABLET, FILM COATED ORAL at 21:40

## 2017-04-15 RX ADMIN — CLOPIDOGREL BISULFATE 75 MILLIGRAM(S): 75 TABLET, FILM COATED ORAL at 12:24

## 2017-04-15 RX ADMIN — AMIODARONE HYDROCHLORIDE 400 MILLIGRAM(S): 400 TABLET ORAL at 06:30

## 2017-04-15 RX ADMIN — SODIUM CHLORIDE 3 MILLILITER(S): 9 INJECTION INTRAMUSCULAR; INTRAVENOUS; SUBCUTANEOUS at 21:39

## 2017-04-15 RX ADMIN — Medication 500 MILLIGRAM(S): at 17:17

## 2017-04-15 RX ADMIN — Medication 100 MILLIGRAM(S): at 05:25

## 2017-04-15 RX ADMIN — Medication 25 MILLIGRAM(S): at 05:26

## 2017-04-15 RX ADMIN — Medication 25 MILLIGRAM(S): at 09:35

## 2017-04-15 RX ADMIN — ENOXAPARIN SODIUM 40 MILLIGRAM(S): 100 INJECTION SUBCUTANEOUS at 12:23

## 2017-04-15 NOTE — PROGRESS NOTE ADULT - PROBLEM SELECTOR PLAN 8
Currently not requiring O2, Continue to encourage incentive janet and ambulation  PT daily
Wean off oxygen as tolerated  PT/OT daily
Wean off oxygen as tolerated  PT/OT daily  Started on diuretics

## 2017-04-15 NOTE — PROGRESS NOTE ADULT - ASSESSMENT
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 CCS 3 stable angina.    Preop course: Negative nuclear stress test. Cath revealed severe 2VD.    On 4/10/17, pt underwent CABG x 2 with LIMA and SVG.    Postoperative Course/Issues: 4/14 AF w/ RVR on and off until 6am 4/15. Pt treated with PO amio load. Now NSR      PLAN  Daily PT/OT  Possible home Sunday  Care Coordination eval (patient lives alone, will need home care and ? other services)

## 2017-04-15 NOTE — PROGRESS NOTE ADULT - PROBLEM SELECTOR PROBLEM 3
Essential hypertension
Hyperlipidemia, unspecified hyperlipidemia type
Essential hypertension
Hyperlipidemia, unspecified hyperlipidemia type
Pure hypercholesterolemia

## 2017-04-15 NOTE — PROGRESS NOTE ADULT - PROBLEM SELECTOR PROBLEM 1
ASHD (arteriosclerotic heart disease)
Coronary artery disease involving native coronary artery of native heart with angina pectoris
ASHD (arteriosclerotic heart disease)
ASHD (arteriosclerotic heart disease)
Coronary artery disease involving native coronary artery of native heart with angina pectoris

## 2017-04-15 NOTE — PROGRESS NOTE ADULT - PROBLEM SELECTOR PLAN 2
Continue home dose of coumadin  no bridge with heparin as d/w Dr. Griffin  For INR followup as outpatient with PMD   Dr. Alice Slade in [place], [phone] (spoke with Darling, patient notified to call the office after discharge to confirm dose)
Continue home dose of coumadin  no bridge with heparin as d/w Dr. Griffin  For INR followup as outpatient with PMD   Dr. Alice Slade in [place], [phone] (spoke with Darling, patient notified to call the office after discharge to confirm dose)
Continue home dose of coumadin  no bridge with heparin as d/w Dr. Griffin  For INR followup as outpatient with PMD   Dr. Alice Slade in [place], [phone] (spoke with Darling, patient notified to call the office after discharge to confirm dose)
cont home dose of coumadin  no bridge with heparin as d/w Dr. ga
cont toprolol  dash diet
as above
cont toprolol  dash diet
on coumadin at home  d/w Dr. Sotelo when to resume

## 2017-04-15 NOTE — PROGRESS NOTE ADULT - PROBLEM SELECTOR PLAN 4
hold coumadin  on heparin
no h/o DM  Off fingersticks
no h/o DM  stable off insulin gtt  cont premeal and ISS with meals
hold coumadin  on heparin
postop  cont insulin gtt  add premeal and transition to ANITA achs

## 2017-04-15 NOTE — PROGRESS NOTE ADULT - SUBJECTIVE AND OBJECTIVE BOX
Subjective: "I'm ok, I had a bad night." Pt oob to chair NAD    VITAL SIGNS  Vital Signs Last 24 Hrs  T(C): 37.1, Max: 37.1 (-15 @ 09:36)  T(F): 98.7, Max: 98.7 (04-15 @ 09:36)  HR: 83 (83 - 159)  BP: 128/63 (100/60 - 136/75)  RR: 18 (16 - 20)  SpO2: 98% (96% - 100%)      Telemetry/Alarms:  AF w/ RVR converted to NSR 80s at 6am  LVEF: 60    MEDICATIONS  aspirin enteric coated 325milliGRAM(s) Oral daily  atorvastatin 40milliGRAM(s) Oral at bedtime  docusate sodium 100milliGRAM(s) Oral three times a day  pantoprazole    Tablet 40milliGRAM(s) Oral before breakfast  clopidogrel Tablet 75milliGRAM(s) Oral daily  enoxaparin Injectable 40milliGRAM(s) SubCutaneous daily  acetaminophen  IVPB. 1000milliGRAM(s) IV Intermittent once  oxyCODONE IR 5milliGRAM(s) Oral every 4 hours PRN  sodium chloride 0.9% lock flush 3milliLiter(s) IV Push every 8 hours  furosemide    Tablet 40milliGRAM(s) Oral daily  potassium chloride    Tablet ER 20milliEquivalent(s) Oral daily  ascorbic acid 500milliGRAM(s) Oral two times a day  senna 2Tablet(s) Oral at bedtime  magnesium hydroxide Suspension 30milliLiter(s) Oral daily PRN  bisacodyl Suppository 10milliGRAM(s) Rectal daily PRN  sorbitol 70% Solution 30milliLiter(s) Oral once  amiodarone    Tablet 400milliGRAM(s) Oral every 8 hours  metoprolol 50milliGRAM(s) Oral two times a day      PHYSICAL EXAM  General: well nourished, well developed, no acute distress  Neurology: alert and oriented x 3, nonfocal, no gross deficits  Respiratory: dimished b/l bases  CV: regular rate and rhythm, normal S1, S2  Abdomen: soft, nontender, nondistended, positive bowel sounds, last bowel movement yest  Extremities: warm, well perfused. +1 edema. + DP pulses  Incisions: midline sternal incision, + mepilex> removed, c/d/i. sternum stable.      I & Os for current day (as of 04-15 @ 12:08)  =============================================  IN: 240 ml / OUT: 0 ml / NET: 240 ml      Weights:  Daily     Daily Weight in k.3 (15 Apr 2017 06:10)  Admit Wt: Drug Dosing Weight  Height (cm): 152.4 (2017 23:31)  Weight (kg): 60.8 (2017 23:31)  BMI (kg/m2): 26.2 (2017 23:31)  BSA (m2): 1.57 (2017 23:31)    LABS  04-15    138  |  99  |  44.0<H>  ----------------------------<  128<H>  4.6   |  25.0  |  1.12    Ca    8.9      15 Apr 2017 01:34  Phos  4.1     04-15  Mg     3.0     04-15                                   10.2   7.0   )-----------( 247      ( 15 Apr 2017 08:06 )             32.4          PT/INR - ( 15 Apr 2017 07:11 )   PT: 16.3 sec;   INR: 1.47 ratio         PTT - ( 15 Apr 2017 07:11 )  PTT:26.4 sec           Today's CXR: Left effusion slightly worse    Today's EKG: SR, qtc 466    PAST MEDICAL & SURGICAL HISTORY:  DVT (deep venous thrombosis)  Pulmonary embolus  ASHD (arteriosclerotic heart disease)  Angina pectoris  HLD (hyperlipidemia)  HTN (hypertension)  H/O foot surgery  H/O: hysterectomy  History of left cataract surgery  History of carpal tunnel release       ASSESSMENT  88y Female was admitted on (Date) from (home/other facility) with    Preop course:    On (Date), pt underwent .    Postoperative Course/Issues:     PLAN  Neuro: Pain management  Pulm: Encourage coughing, deep breathing and use of incentive spirometry. Wean off supplemental oxygen as able. Daily CXR.   Cardio: Continue Aspirin, Lipitor. (BB)  GI: Tolerating diet. Continue stool softeners.  Renal: Keep negative fluid balance. (Diuretics) Trend BUN/Cr. Supplement electrolytes prn.   :   Vasc: Lovenox/SCDs for DVT prophylaxis  Heme: Stable H/H. Trend CBC daily.   Endo:  ID: Off antibiotics. Stable.  Therapy: PT daily as tolerated.  Tubes/Wires:   Disposition: Aim to D/C to home on  Discussed with Cardiothoracic Team at AM rounds.

## 2017-04-15 NOTE — PROGRESS NOTE ADULT - PROBLEM SELECTOR PLAN 6
Lasix discontinued for now, but will need it resumed eventually. Held for relative lower pressures with need to inc beta blockers
cont Lasix   Replete magnesium for hypomagnesemia
Start diuretics today  Replete magnesium for hypomagnesemia

## 2017-04-15 NOTE — PROGRESS NOTE ADULT - PROBLEM SELECTOR PLAN 7
Continue colace,   Add senna, prn MOM
Continue colace,   Added sofia, connien MOM  + BM
Continue colace,   Add senna, prn MOM

## 2017-04-15 NOTE — PROGRESS NOTE ADULT - PROBLEM SELECTOR PLAN 1
3vd  preop for 4/10  TTE done,  cartiods done, PFTS done  cont aspirn, heparin
s/p cabg  cont ASA, Plavix, Lopressor, Lipitor  OOB/ambulate with PT
s/p cabg  cont asa plavix, lopressor, lipitor  protonix for ppx  OOB/ambulate with PT  deintensify, d/c CT  possible transfer to floor today
+cath, for or tomorrow, patient NPO after midnight, follow up labs.
3vd  preop for 4/10  TTE done,  fu cartiods, PFTS  cont aspirn, heparin
s/p cabg  cont asa plavix  start lopressor   advance diet  protonix for ppx  OOB/ambulate with PT

## 2017-04-15 NOTE — PROGRESS NOTE ADULT - PROBLEM SELECTOR PROBLEM 2
Essential hypertension
Other chronic pulmonary embolism
Essential hypertension
Essential hypertension
Other chronic pulmonary embolism

## 2017-04-15 NOTE — PROGRESS NOTE ADULT - PROBLEM SELECTOR PROBLEM 5
Hyperlipidemia, unspecified hyperlipidemia type

## 2017-04-15 NOTE — PROGRESS NOTE ADULT - PROBLEM SELECTOR PLAN 10
Continue Amio load, daily ekgs, Lopressor
Lovenox for DVT prophylaxis  Protonix for GI prophylaxis
Lovenox for DVT prophylaxis  Protonix for GI prophylaxis

## 2017-04-15 NOTE — PROGRESS NOTE ADULT - PROBLEM SELECTOR PLAN 3
Lopressor dose reduced yest d/t hypotension while rapid AF.  Resumed and inc Lopressor to 50 bid (equivalent to home dose). Lisinopril held for now
Uptitrate Lopressor if still necessary after lasix dose (was on 100mg daily at home)  Continue Lisinopril 10 mg (was on 20 mg at home)
Uptitrate Lopressor if still necessary after lasix dose (was on 100mg daily at home)  Continue Lisinopril 10 mg (was on 20 mg at home)
low fat chol diet  increase statin dose as per cardiology
titrate lopressor as HR allows  resume lisinopril if unable to titrate lopressor
as above
low fat chol diet  cont statin
start lopressor, resume lisinopril if needed

## 2017-04-15 NOTE — PROGRESS NOTE ADULT - PROBLEM SELECTOR PROBLEM 7
Constipation, unspecified constipation type

## 2017-04-15 NOTE — PROGRESS NOTE ADULT - PROBLEM SELECTOR PROBLEM 4
Hyperglycemia, unspecified
Other chronic pulmonary embolism without acute cor pulmonale
Hyperglycemia, unspecified
Other chronic pulmonary embolism without acute cor pulmonale

## 2017-04-16 VITALS
DIASTOLIC BLOOD PRESSURE: 52 MMHG | TEMPERATURE: 98 F | HEART RATE: 80 BPM | SYSTOLIC BLOOD PRESSURE: 145 MMHG | OXYGEN SATURATION: 99 % | RESPIRATION RATE: 19 BRPM

## 2017-04-16 LAB
ANION GAP SERPL CALC-SCNC: 13 MMOL/L — SIGNIFICANT CHANGE UP (ref 5–17)
BUN SERPL-MCNC: 42 MG/DL — HIGH (ref 8–20)
CALCIUM SERPL-MCNC: 8.6 MG/DL — SIGNIFICANT CHANGE UP (ref 8.6–10.2)
CHLORIDE SERPL-SCNC: 103 MMOL/L — SIGNIFICANT CHANGE UP (ref 98–107)
CO2 SERPL-SCNC: 26 MMOL/L — SIGNIFICANT CHANGE UP (ref 22–29)
CREAT SERPL-MCNC: 1.08 MG/DL — SIGNIFICANT CHANGE UP (ref 0.5–1.3)
GLUCOSE SERPL-MCNC: 99 MG/DL — SIGNIFICANT CHANGE UP (ref 70–115)
HCT VFR BLD CALC: 30.7 % — LOW (ref 37–47)
HGB BLD-MCNC: 9.9 G/DL — LOW (ref 12–16)
INR BLD: 2.03 RATIO — HIGH (ref 0.88–1.16)
MAGNESIUM SERPL-MCNC: 2.4 MG/DL — SIGNIFICANT CHANGE UP (ref 1.8–2.5)
MCHC RBC-ENTMCNC: 27.4 PG — SIGNIFICANT CHANGE UP (ref 27–31)
MCHC RBC-ENTMCNC: 32.2 G/DL — SIGNIFICANT CHANGE UP (ref 32–36)
MCV RBC AUTO: 85 FL — SIGNIFICANT CHANGE UP (ref 81–99)
PHOSPHATE SERPL-MCNC: 3.9 MG/DL — SIGNIFICANT CHANGE UP (ref 2.4–4.7)
PLATELET # BLD AUTO: 275 K/UL — SIGNIFICANT CHANGE UP (ref 150–400)
POTASSIUM SERPL-MCNC: 4.4 MMOL/L — SIGNIFICANT CHANGE UP (ref 3.5–5.3)
POTASSIUM SERPL-SCNC: 4.4 MMOL/L — SIGNIFICANT CHANGE UP (ref 3.5–5.3)
PROTHROM AB SERPL-ACNC: 22.6 SEC — HIGH (ref 9.8–12.7)
RBC # BLD: 3.61 M/UL — LOW (ref 4.4–5.2)
RBC # FLD: 15.8 % — HIGH (ref 11–15.6)
SODIUM SERPL-SCNC: 142 MMOL/L — SIGNIFICANT CHANGE UP (ref 135–145)
WBC # BLD: 7.5 K/UL — SIGNIFICANT CHANGE UP (ref 4.8–10.8)
WBC # FLD AUTO: 7.5 K/UL — SIGNIFICANT CHANGE UP (ref 4.8–10.8)

## 2017-04-16 PROCEDURE — P9016: CPT

## 2017-04-16 PROCEDURE — C1889: CPT

## 2017-04-16 PROCEDURE — 99239 HOSP IP/OBS DSCHRG MGMT >30: CPT

## 2017-04-16 PROCEDURE — 86900 BLOOD TYPING SEROLOGIC ABO: CPT

## 2017-04-16 PROCEDURE — 81003 URINALYSIS AUTO W/O SCOPE: CPT

## 2017-04-16 PROCEDURE — 84100 ASSAY OF PHOSPHORUS: CPT

## 2017-04-16 PROCEDURE — 83036 HEMOGLOBIN GLYCOSYLATED A1C: CPT

## 2017-04-16 PROCEDURE — 94010 BREATHING CAPACITY TEST: CPT

## 2017-04-16 PROCEDURE — 82150 ASSAY OF AMYLASE: CPT

## 2017-04-16 PROCEDURE — 93308 TTE F-UP OR LMTD: CPT

## 2017-04-16 PROCEDURE — 84295 ASSAY OF SERUM SODIUM: CPT

## 2017-04-16 PROCEDURE — 83880 ASSAY OF NATRIURETIC PEPTIDE: CPT

## 2017-04-16 PROCEDURE — 85027 COMPLETE CBC AUTOMATED: CPT

## 2017-04-16 PROCEDURE — 93454 CORONARY ARTERY ANGIO S&I: CPT

## 2017-04-16 PROCEDURE — 82803 BLOOD GASES ANY COMBINATION: CPT

## 2017-04-16 PROCEDURE — C1894: CPT

## 2017-04-16 PROCEDURE — 71010: CPT | Mod: 26

## 2017-04-16 PROCEDURE — 82550 ASSAY OF CK (CPK): CPT

## 2017-04-16 PROCEDURE — 86920 COMPATIBILITY TEST SPIN: CPT

## 2017-04-16 PROCEDURE — 84436 ASSAY OF TOTAL THYROXINE: CPT

## 2017-04-16 PROCEDURE — 71045 X-RAY EXAM CHEST 1 VIEW: CPT

## 2017-04-16 PROCEDURE — 80076 HEPATIC FUNCTION PANEL: CPT

## 2017-04-16 PROCEDURE — 86901 BLOOD TYPING SEROLOGIC RH(D): CPT

## 2017-04-16 PROCEDURE — C8929: CPT

## 2017-04-16 PROCEDURE — C1887: CPT

## 2017-04-16 PROCEDURE — 36415 COLL VENOUS BLD VENIPUNCTURE: CPT

## 2017-04-16 PROCEDURE — 97530 THERAPEUTIC ACTIVITIES: CPT

## 2017-04-16 PROCEDURE — 80053 COMPREHEN METABOLIC PANEL: CPT

## 2017-04-16 PROCEDURE — 97110 THERAPEUTIC EXERCISES: CPT

## 2017-04-16 PROCEDURE — 82947 ASSAY GLUCOSE BLOOD QUANT: CPT

## 2017-04-16 PROCEDURE — 99231 SBSQ HOSP IP/OBS SF/LOW 25: CPT

## 2017-04-16 PROCEDURE — 84443 ASSAY THYROID STIM HORMONE: CPT

## 2017-04-16 PROCEDURE — 85610 PROTHROMBIN TIME: CPT

## 2017-04-16 PROCEDURE — 97116 GAIT TRAINING THERAPY: CPT

## 2017-04-16 PROCEDURE — P9045: CPT

## 2017-04-16 PROCEDURE — 82435 ASSAY OF BLOOD CHLORIDE: CPT

## 2017-04-16 PROCEDURE — 83735 ASSAY OF MAGNESIUM: CPT

## 2017-04-16 PROCEDURE — 85014 HEMATOCRIT: CPT

## 2017-04-16 PROCEDURE — 80048 BASIC METABOLIC PNL TOTAL CA: CPT

## 2017-04-16 PROCEDURE — 93005 ELECTROCARDIOGRAM TRACING: CPT

## 2017-04-16 PROCEDURE — 87641 MR-STAPH DNA AMP PROBE: CPT

## 2017-04-16 PROCEDURE — 84132 ASSAY OF SERUM POTASSIUM: CPT

## 2017-04-16 PROCEDURE — 87640 STAPH A DNA AMP PROBE: CPT

## 2017-04-16 PROCEDURE — 83605 ASSAY OF LACTIC ACID: CPT

## 2017-04-16 PROCEDURE — 86850 RBC ANTIBODY SCREEN: CPT

## 2017-04-16 PROCEDURE — 82553 CREATINE MB FRACTION: CPT

## 2017-04-16 PROCEDURE — 84480 ASSAY TRIIODOTHYRONINE (T3): CPT

## 2017-04-16 PROCEDURE — 82330 ASSAY OF CALCIUM: CPT

## 2017-04-16 PROCEDURE — 84134 ASSAY OF PREALBUMIN: CPT

## 2017-04-16 PROCEDURE — 84484 ASSAY OF TROPONIN QUANT: CPT

## 2017-04-16 PROCEDURE — 85730 THROMBOPLASTIN TIME PARTIAL: CPT

## 2017-04-16 PROCEDURE — C1769: CPT

## 2017-04-16 PROCEDURE — 94002 VENT MGMT INPAT INIT DAY: CPT

## 2017-04-16 PROCEDURE — 93010 ELECTROCARDIOGRAM REPORT: CPT

## 2017-04-16 PROCEDURE — 97163 PT EVAL HIGH COMPLEX 45 MIN: CPT

## 2017-04-16 PROCEDURE — 93880 EXTRACRANIAL BILAT STUDY: CPT

## 2017-04-16 RX ORDER — SENNA PLUS 8.6 MG/1
2 TABLET ORAL
Qty: 0 | Refills: 0 | COMMUNITY
Start: 2017-04-16

## 2017-04-16 RX ORDER — WARFARIN SODIUM 2.5 MG/1
1 TABLET ORAL
Qty: 0 | Refills: 0 | COMMUNITY

## 2017-04-16 RX ORDER — ATORVASTATIN CALCIUM 80 MG/1
1 TABLET, FILM COATED ORAL
Qty: 0 | Refills: 0 | COMMUNITY

## 2017-04-16 RX ORDER — WARFARIN SODIUM 2.5 MG/1
5 TABLET ORAL ONCE
Qty: 0 | Refills: 0 | Status: DISCONTINUED | OUTPATIENT
Start: 2017-04-16 | End: 2017-04-16

## 2017-04-16 RX ORDER — METOPROLOL TARTRATE 50 MG
1 TABLET ORAL
Qty: 60 | Refills: 1 | OUTPATIENT
Start: 2017-04-16 | End: 2017-06-14

## 2017-04-16 RX ORDER — CLOPIDOGREL BISULFATE 75 MG/1
1 TABLET, FILM COATED ORAL
Qty: 30 | Refills: 1 | OUTPATIENT
Start: 2017-04-16 | End: 2017-06-14

## 2017-04-16 RX ORDER — PANTOPRAZOLE SODIUM 20 MG/1
1 TABLET, DELAYED RELEASE ORAL
Qty: 14 | Refills: 0 | OUTPATIENT
Start: 2017-04-16 | End: 2017-04-30

## 2017-04-16 RX ORDER — METOPROLOL TARTRATE 50 MG
0 TABLET ORAL
Qty: 0 | Refills: 0 | COMMUNITY

## 2017-04-16 RX ORDER — AMIODARONE HYDROCHLORIDE 400 MG/1
1 TABLET ORAL
Qty: 30 | Refills: 0 | OUTPATIENT
Start: 2017-04-16 | End: 2017-05-16

## 2017-04-16 RX ORDER — ASCORBIC ACID 60 MG
1 TABLET,CHEWABLE ORAL
Qty: 0 | Refills: 0 | COMMUNITY
Start: 2017-04-16

## 2017-04-16 RX ORDER — LISINOPRIL 2.5 MG/1
1 TABLET ORAL
Qty: 0 | Refills: 0 | COMMUNITY

## 2017-04-16 RX ORDER — OXYCODONE HYDROCHLORIDE 5 MG/1
1 TABLET ORAL
Qty: 42 | Refills: 0 | OUTPATIENT
Start: 2017-04-16 | End: 2017-04-23

## 2017-04-16 RX ADMIN — CLOPIDOGREL BISULFATE 75 MILLIGRAM(S): 75 TABLET, FILM COATED ORAL at 12:07

## 2017-04-16 RX ADMIN — Medication 500 MILLIGRAM(S): at 05:16

## 2017-04-16 RX ADMIN — AMIODARONE HYDROCHLORIDE 400 MILLIGRAM(S): 400 TABLET ORAL at 05:16

## 2017-04-16 RX ADMIN — Medication 100 MILLIGRAM(S): at 05:16

## 2017-04-16 RX ADMIN — SODIUM CHLORIDE 3 MILLILITER(S): 9 INJECTION INTRAMUSCULAR; INTRAVENOUS; SUBCUTANEOUS at 15:42

## 2017-04-16 RX ADMIN — PANTOPRAZOLE SODIUM 40 MILLIGRAM(S): 20 TABLET, DELAYED RELEASE ORAL at 05:16

## 2017-04-16 RX ADMIN — SODIUM CHLORIDE 3 MILLILITER(S): 9 INJECTION INTRAMUSCULAR; INTRAVENOUS; SUBCUTANEOUS at 05:15

## 2017-04-16 RX ADMIN — AMIODARONE HYDROCHLORIDE 400 MILLIGRAM(S): 400 TABLET ORAL at 12:07

## 2017-04-16 RX ADMIN — Medication 100 MILLIGRAM(S): at 12:09

## 2017-04-16 RX ADMIN — Medication 325 MILLIGRAM(S): at 12:07

## 2017-04-16 RX ADMIN — Medication 50 MILLIGRAM(S): at 05:16

## 2017-04-16 NOTE — PROGRESS NOTE ADULT - SUBJECTIVE AND OBJECTIVE BOX
Subjective: "I feel better today, slept good last nite"  OOB chair  eayong breakfast  Tele:   SR  60's  Vital Signs Last 24 Hrs  T(C): 36.7, Max: 37.1 (04-15 @ 21:34)  T(F): 98, Max: 98.8 (04-15 @ 21:34)  HR: 82 (75 - 87)  BP: 152/68 (144/70 - 152/68)  RR: 16 (16 - 21)  SpO2: 96% (96% - 98%)                  LV EF:  60%  Labs:      142  |  103  |  42.0<H>  ----------------------------<  99  4.4   |  26.0  |  1.08    Ca    8.6      2017 05:30  Phos  3.9     -  Mg     2.4     -    TPro  6.5<L>  /  Alb  3.0<L>  /  TBili  0.5  /  DBili  x   /  AST  23  /  ALT  17  /  AlkPhos  50  04-15                             9.9    7.5   )-----------( 275      ( 2017 05:30 )             30.7       PT/INR - ( 2017 05:30 )   PT: 22.6 sec;   INR: 2.03 ratio         PTT - ( 15 Apr 2017 07:11 )  PTT:26.4 sec                 I&O's Detail    I & Os for current day (as of 2017 10:11)  =============================================  IN:    Oral Fluid: 480 ml    Total IN: 480 ml  ---------------------------------------------  OUT:    Voided: 350 ml    Total OUT: 350 ml  ---------------------------------------------  Total NET: 130 ml    CXR: R base atelectasis  EKG:         Daily Weight in k.1 (2017 05:33)    CHEST TUBE:  [ ] YES [x ] NO  OUTPUT:     per 24 hours    AIR LEAKS:  [ ] YES [ ] NO      PRINCESS DRAIN:   [ ] YES [x ] NO  OUTPUT:     per 24 hours    EPICARDIAL WIRES:  [ ] YES [ x] NO      BOWEL MOVEMENT:  [ x] YES [ ] NO      MEDICATIONS  (STANDING):  aspirin enteric coated 325milliGRAM(s) Oral daily  atorvastatin 40milliGRAM(s) Oral at bedtime  docusate sodium 100milliGRAM(s) Oral three times a day  pantoprazole    Tablet 40milliGRAM(s) Oral before breakfast  clopidogrel Tablet 75milliGRAM(s) Oral daily  sodium chloride 0.9% lock flush 3milliLiter(s) IV Push every 8 hours  ascorbic acid 500milliGRAM(s) Oral two times a day  senna 2Tablet(s) Oral at bedtime  sorbitol 70% Solution 30milliLiter(s) Oral once  amiodarone    Tablet 400milliGRAM(s) Oral every 8 hours  metoprolol 50milliGRAM(s) Oral two times a day  warfarin 5milliGRAM(s) Oral once    MEDICATIONS  (PRN):  magnesium hydroxide Suspension 30milliLiter(s) Oral daily PRN Constipation  bisacodyl Suppository 10milliGRAM(s) Rectal daily PRN Constipation  oxyCODONE  5 mG/acetaminophen 325 mG 1Tablet(s) Oral every 4 hours PRN Moderate Pain (4 - 6)      Physical Exam:    Neuro: alert, no deficits    Pulm: diminshed at bases bilaterally    CV: S1S2  RRR    Abd: soft, non tender + bowel sounds    Extremities:    Incision(s):             PAST MEDICAL & SURGICAL HISTORY:  DVT (deep venous thrombosis)  Pulmonary embolus  ASHD (arteriosclerotic heart disease)  Angina pectoris  HLD (hyperlipidemia)  HTN (hypertension)  H/O foot surgery  H/O: hysterectomy  History of left cataract surgery  History of carpal tunnel release      Assessment:          88y Female with a 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 CCS 3 stable angina.    Preop course: Negative nuclear stress test. Cath revealed severe 2VD.    On 4/10/17, pt underwent CABG x 2 with LIMA and SVG.    Postoperative Course/Issues: transient Afib> amio load started w/ conversion NSR        Plan:  Continue ASA plavix statin betablocker CAD  Amio x 1 month transient Afib  anemia supplements  backround diuretics fluid overload  Predischarge echo to R/O effusion  Care Coordination to setup home draw INR  Coumadin (on preop) history DVT /PE  INR followed by Dr Slade  Discharge home today  Discussed am rounds Dr Sheets Subjective: "I feel better today, slept good last nite"  OOB chair  eatong breakfast  Tele:   SR  60's  Vital Signs Last 24 Hrs  T(C): 36.7, Max: 37.1 (04-15 @ 21:34)  T(F): 98, Max: 98.8 (04-15 @ 21:34)  HR: 82 (75 - 87)  BP: 152/68 (144/70 - 152/68)  RR: 16 (16 - 21)  SpO2: 96% (96% - 98%)                  LV EF:  60%  Labs:      142  |  103  |  42.0<H>  ----------------------------<  99  4.4   |  26.0  |  1.08    Ca    8.6      2017 05:30  Phos  3.9       Mg     2.4         TPro  6.5<L>  /  Alb  3.0<L>  /  TBili  0.5  /  DBili  x   /  AST  23  /  ALT  17  /  AlkPhos  50  04-15                             9.9    7.5   )-----------( 275      ( 2017 05:30 )             30.7       PT/INR - ( 2017 05:30 )   PT: 22.6 sec;   INR: 2.03 ratio         PTT - ( 15 Apr 2017 07:11 )  PTT:26.4 sec                 I&O's Detail    I & Os for current day (as of 2017 10:11)  =============================================  IN:    Oral Fluid: 480 ml    Total IN: 480 ml  ---------------------------------------------  OUT:    Voided: 350 ml    Total OUT: 350 ml  ---------------------------------------------  Total NET: 130 ml    CXR: R base atelectasis  EKG:         Daily Weight in k.1 (2017 05:33)    CHEST TUBE:  [ ] YES [x ] NO  OUTPUT:     per 24 hours    AIR LEAKS:  [ ] YES [ ] NO      PRINCESS DRAIN:   [ ] YES [x ] NO  OUTPUT:     per 24 hours    EPICARDIAL WIRES:  [ ] YES [ x] NO      BOWEL MOVEMENT:  [ x] YES [ ] NO      MEDICATIONS  (STANDING):  aspirin enteric coated 325milliGRAM(s) Oral daily  atorvastatin 40milliGRAM(s) Oral at bedtime  docusate sodium 100milliGRAM(s) Oral three times a day  pantoprazole    Tablet 40milliGRAM(s) Oral before breakfast  clopidogrel Tablet 75milliGRAM(s) Oral daily  sodium chloride 0.9% lock flush 3milliLiter(s) IV Push every 8 hours  ascorbic acid 500milliGRAM(s) Oral two times a day  senna 2Tablet(s) Oral at bedtime  sorbitol 70% Solution 30milliLiter(s) Oral once  amiodarone    Tablet 400milliGRAM(s) Oral every 8 hours  metoprolol 50milliGRAM(s) Oral two times a day  warfarin 5milliGRAM(s) Oral once    MEDICATIONS  (PRN):  magnesium hydroxide Suspension 30milliLiter(s) Oral daily PRN Constipation  bisacodyl Suppository 10milliGRAM(s) Rectal daily PRN Constipation  oxyCODONE  5 mG/acetaminophen 325 mG 1Tablet(s) Oral every 4 hours PRN Moderate Pain (4 - 6)      Physical Exam:    Neuro: alert, no deficits    Pulm: diminshed at bases bilaterally    CV: S1S2  RRR    Abd: soft, non tender + bowel sounds    Extremities:  trace edema B/L    Incision(s): sternum stable, incision clean, + ecchymosis            PAST MEDICAL & SURGICAL HISTORY:  DVT (deep venous thrombosis)  Pulmonary embolus  ASHD (arteriosclerotic heart disease)  Angina pectoris  HLD (hyperlipidemia)  HTN (hypertension)  H/O foot surgery  H/O: hysterectomy  History of left cataract surgery  History of carpal tunnel release      Assessment:          88y Female with a 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 CCS 3 stable angina.    Preop course: Negative nuclear stress test. Cath revealed severe 2VD.    On 4/10/17, pt underwent CABG x 2 with LIMA and SVG.    Postoperative Course/Issues: transient Afib> amio load started w/ conversion NSR        Plan:  Continue ASA plavix statin betablocker CAD  Amio x 1 month transient Afib  anemia supplements  backround diuretics fluid overload  Predischarge echo to R/O effusion  Care Coordination to setup home draw INR  Coumadin (on preop) history DVT /PE  INR followed by Dr Slade  Discharge home today  Discussed am rounds Dr Sheets Subjective: "I feel better today, slept good last nite"  OOB chair  eatong breakfast  Tele:   SR  60's  Vital Signs Last 24 Hrs  T(C): 36.7, Max: 37.1 (04-15 @ 21:34)  T(F): 98, Max: 98.8 (04-15 @ 21:34)  HR: 82 (75 - 87)  BP: 152/68 (144/70 - 152/68)  RR: 16 (16 - 21)  SpO2: 96% (96% - 98%)                  LV EF:  60%  Labs:      142  |  103  |  42.0<H>  ----------------------------<  99  4.4   |  26.0  |  1.08    Ca    8.6      2017 05:30  Phos  3.9       Mg     2.4         TPro  6.5<L>  /  Alb  3.0<L>  /  TBili  0.5  /  DBili  x   /  AST  23  /  ALT  17  /  AlkPhos  50  04-15                             9.9    7.5   )-----------( 275      ( 2017 05:30 )             30.7       PT/INR - ( 2017 05:30 )   PT: 22.6 sec;   INR: 2.03 ratio         PTT - ( 15 Apr 2017 07:11 )  PTT:26.4 sec                 I&O's Detail    I & Os for current day (as of 2017 10:11)  =============================================  IN:    Oral Fluid: 480 ml    Total IN: 480 ml  ---------------------------------------------  OUT:    Voided: 350 ml    Total OUT: 350 ml  ---------------------------------------------  Total NET: 130 ml    CXR: R base atelectasis  EKG: NSR  72  QTC  448         Daily Weight in k.1 (2017 05:33)    CHEST TUBE:  [ ] YES [x ] NO  OUTPUT:     per 24 hours    AIR LEAKS:  [ ] YES [ ] NO      PRINCESS DRAIN:   [ ] YES [x ] NO  OUTPUT:     per 24 hours    EPICARDIAL WIRES:  [ ] YES [ x] NO      BOWEL MOVEMENT:  [ x] YES [ ] NO      MEDICATIONS  (STANDING):  aspirin enteric coated 325milliGRAM(s) Oral daily  atorvastatin 40milliGRAM(s) Oral at bedtime  docusate sodium 100milliGRAM(s) Oral three times a day  pantoprazole    Tablet 40milliGRAM(s) Oral before breakfast  clopidogrel Tablet 75milliGRAM(s) Oral daily  sodium chloride 0.9% lock flush 3milliLiter(s) IV Push every 8 hours  ascorbic acid 500milliGRAM(s) Oral two times a day  senna 2Tablet(s) Oral at bedtime  sorbitol 70% Solution 30milliLiter(s) Oral once  amiodarone    Tablet 400milliGRAM(s) Oral every 8 hours  metoprolol 50milliGRAM(s) Oral two times a day  warfarin 5milliGRAM(s) Oral once    MEDICATIONS  (PRN):  magnesium hydroxide Suspension 30milliLiter(s) Oral daily PRN Constipation  bisacodyl Suppository 10milliGRAM(s) Rectal daily PRN Constipation  oxyCODONE  5 mG/acetaminophen 325 mG 1Tablet(s) Oral every 4 hours PRN Moderate Pain (4 - 6)      Physical Exam:    Neuro: alert, no deficits    Pulm: diminshed at bases bilaterally    CV: S1S2  RRR    Abd: soft, non tender + bowel sounds    Extremities:  trace edema B/L    Incision(s): sternum stable, incision clean, + ecchymosis            PAST MEDICAL & SURGICAL HISTORY:  DVT (deep venous thrombosis)  Pulmonary embolus  ASHD (arteriosclerotic heart disease)  Angina pectoris  HLD (hyperlipidemia)  HTN (hypertension)  H/O foot surgery  H/O: hysterectomy  History of left cataract surgery  History of carpal tunnel release      Assessment:          88y Female with a 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 CCS 3 stable angina.    Preop course: Negative nuclear stress test. Cath revealed severe 2VD.    On 4/10/17, pt underwent CABG x 2 with LIMA and SVG.    Postoperative Course/Issues: transient Afib> amio load started w/ conversion NSR        Plan:  Continue ASA plavix statin betablocker CAD  Amio x 1 month transient Afib  anemia supplements  backround diuretics fluid overload  Predischarge echo to R/O effusion  Care Coordination to setup home draw INR  Coumadin (on preop) history DVT /PE  INR followed by Dr Slade  Discharge home today  Discussed am rounds Dr Sheets

## 2017-04-17 PROBLEM — Z86.79 HISTORY OF HYPERTENSION: Status: RESOLVED | Noted: 2017-04-17 | Resolved: 2017-04-17

## 2017-04-17 PROBLEM — Z86.39 HISTORY OF HYPERLIPIDEMIA: Status: RESOLVED | Noted: 2017-04-17 | Resolved: 2017-04-17

## 2017-04-17 PROBLEM — Z86.711 HISTORY OF PULMONARY EMBOLISM: Status: RESOLVED | Noted: 2017-04-17 | Resolved: 2017-04-17

## 2017-04-17 PROBLEM — Z87.891 FORMER SMOKER: Status: ACTIVE | Noted: 2017-04-17

## 2017-04-17 PROBLEM — Z86.69 HISTORY OF CATARACT: Status: RESOLVED | Noted: 2017-04-17 | Resolved: 2017-04-17

## 2017-04-17 PROBLEM — I25.10 CAD (CORONARY ARTERY DISEASE): Status: ACTIVE | Noted: 2017-04-17

## 2017-04-17 PROBLEM — Z86.718 HISTORY OF DEEP VENOUS THROMBOSIS: Status: RESOLVED | Noted: 2017-04-17 | Resolved: 2017-04-17

## 2017-04-17 RX ORDER — OXYCODONE HYDROCHLORIDE AND ACETAMINOPHEN 5; 325 MG/1; MG/1
5-325 TABLET ORAL
Refills: 0 | Status: ACTIVE | COMMUNITY

## 2017-04-17 RX ORDER — WARFARIN SODIUM 5 MG/1
5 TABLET ORAL
Refills: 0 | Status: ACTIVE | COMMUNITY

## 2017-04-17 RX ORDER — METOPROLOL TARTRATE 50 MG/1
50 TABLET ORAL
Refills: 0 | Status: ACTIVE | COMMUNITY

## 2017-04-17 RX ORDER — ASPIRIN 81 MG
81 TABLET, DELAYED RELEASE (ENTERIC COATED) ORAL
Refills: 0 | Status: ACTIVE | COMMUNITY

## 2017-04-17 RX ORDER — CLOPIDOGREL 75 MG/1
75 TABLET, FILM COATED ORAL
Refills: 0 | Status: ACTIVE | COMMUNITY

## 2017-04-17 RX ORDER — ATORVASTATIN CALCIUM 10 MG/1
10 TABLET, FILM COATED ORAL
Refills: 0 | Status: ACTIVE | COMMUNITY

## 2017-04-17 RX ORDER — AMIODARONE HYDROCHLORIDE 200 MG/1
200 TABLET ORAL
Refills: 0 | Status: ACTIVE | COMMUNITY

## 2017-04-17 RX ORDER — PANTOPRAZOLE SODIUM 40 MG/1
40 GRANULE, DELAYED RELEASE ORAL
Refills: 0 | Status: ACTIVE | COMMUNITY

## 2017-04-18 PROBLEM — I10 ESSENTIAL (PRIMARY) HYPERTENSION: Chronic | Status: ACTIVE | Noted: 2017-04-04

## 2017-04-18 PROBLEM — I20.9 ANGINA PECTORIS, UNSPECIFIED: Chronic | Status: ACTIVE | Noted: 2017-04-04

## 2017-04-18 PROBLEM — I26.99 OTHER PULMONARY EMBOLISM WITHOUT ACUTE COR PULMONALE: Chronic | Status: ACTIVE | Noted: 2017-04-04

## 2017-04-18 PROBLEM — I25.10 ATHEROSCLEROTIC HEART DISEASE OF NATIVE CORONARY ARTERY WITHOUT ANGINA PECTORIS: Chronic | Status: ACTIVE | Noted: 2017-04-04

## 2017-04-18 PROBLEM — E78.5 HYPERLIPIDEMIA, UNSPECIFIED: Chronic | Status: ACTIVE | Noted: 2017-04-04

## 2017-04-28 ENCOUNTER — APPOINTMENT (OUTPATIENT)
Dept: CARDIOTHORACIC SURGERY | Facility: CLINIC | Age: 82
End: 2017-04-28

## 2017-05-09 ENCOUNTER — APPOINTMENT (OUTPATIENT)
Dept: CARDIOTHORACIC SURGERY | Facility: CLINIC | Age: 82
End: 2017-05-09

## 2017-05-09 VITALS
HEART RATE: 88 BPM | SYSTOLIC BLOOD PRESSURE: 198 MMHG | WEIGHT: 127 LBS | RESPIRATION RATE: 16 BRPM | DIASTOLIC BLOOD PRESSURE: 82 MMHG | OXYGEN SATURATION: 99 % | HEIGHT: 60 IN | BODY MASS INDEX: 24.94 KG/M2

## 2017-05-09 NOTE — PHYSICAL EXAM
[] : no respiratory distress [Auscultation Breath Sounds / Voice Sounds] : lungs were clear to auscultation bilaterally [Heart Rate And Rhythm] : heart rate was normal and rhythm regular [Clean] : clean [Dry] : dry [Healing Well] : healing well [___ +] : bilateral ankle [unfilled]U+ pitting edema

## 2017-05-12 NOTE — CONSULT LETTER
[Dear  ___] : Dear  [unfilled], [Consult Letter:] : I had the pleasure of evaluating your patient, [unfilled]. [Please see my note below.] : Please see my note below. [Consult Closing:] : Thank you very much for allowing me to participate in the care of this patient.  If you have any questions, please do not hesitate to contact me. [Sincerely,] : Sincerely, [Donnie Griffin MD] : Donnie Griffin MD [Chief] : Chief [[facility]] : [facility] [FreeTextEntry2] : Dr.Steven Mcclain\par [address]\par [place] [id number]

## 2017-05-12 NOTE — ASSESSMENT
[FreeTextEntry1] : Very pleasant patient following coronary artery bypass grafting on April 10, 2017. The patient is currently at home doing very well. Patient reports significant improvement over the last few weeks. The patient is becoming more active, and beginning to exercise. Patient reports feeling fatigued and mild shortness of breath, and some incisional discomfort, but reports that this is getting better over the last few days. On exam all the vital signs are stable. Lungs are clear. The heart sounds are normal. All the incisions are healing nicely.   The sternum is stable, without any evidence of infection. I have advised the patient to continue with physical activity, and to follow up with you for the rest of the cardiovascular care. Thank you for the opportunity to participate in the care of your patient. Please do not hesitate to call me if I can be of further assistance.

## 2019-05-17 NOTE — PROGRESS NOTE ADULT - PROBLEM/PLAN-4
to increase independence
DISPLAY PLAN FREE TEXT

## 2020-03-22 NOTE — ASU PATIENT PROFILE, ADULT - NSCAFFEINETYPE_GEN_ALL_CORE_SD
Airway patent. Nasal mucosa clear. Mouth with normal mucosa. Throat has no vesicles, no oropharyngeal exudates and uvula is midline. coffee

## 2020-06-19 NOTE — PRE-OP CHECKLIST - BOWEL PREP
Auth: [id number]  Date: 6/24/2020 thru 7/08/2020  CPT: 07458, [id number]  DX: M54.16, M51.26, M48.061, M51.36   Outpatient  Procedure: HAYDEN  SX Location: [facility]   Insurance: [address] [place] [address] Rd: [place]        CPT: [id number], [id number]     [address] [place] [address]
n/a

## 2022-07-13 PROBLEM — I82.409 ACUTE EMBOLISM AND THROMBOSIS OF UNSPECIFIED DEEP VEINS OF UNSPECIFIED LOWER EXTREMITY: Chronic | Status: ACTIVE | Noted: 2017-04-04

## 2022-07-16 ENCOUNTER — NON-APPOINTMENT (OUTPATIENT)
Age: 87
End: 2022-07-16

## 2022-07-22 ENCOUNTER — APPOINTMENT (OUTPATIENT)
Dept: ORTHOPEDIC SURGERY | Facility: CLINIC | Age: 87
End: 2022-07-22

## 2022-07-22 VITALS — WEIGHT: 127 LBS | HEIGHT: 60 IN | BODY MASS INDEX: 24.94 KG/M2

## 2022-07-22 DIAGNOSIS — M19.042 PRIMARY OSTEOARTHRITIS, LEFT HAND: ICD-10-CM

## 2022-07-22 DIAGNOSIS — M10.9 GOUT, UNSPECIFIED: ICD-10-CM

## 2022-07-22 PROCEDURE — 73130 X-RAY EXAM OF HAND: CPT | Mod: LT

## 2022-07-22 PROCEDURE — 99203 OFFICE O/P NEW LOW 30 MIN: CPT

## 2022-07-22 NOTE — DISCUSSION/SUMMARY
[de-identified] : Nature of dx discussed, she may try warm soaks or compresses.\par She does not have pain today - recommend watchful observation\par Return prn. Monitor for increased symptoms, pain or infection.

## 2022-07-22 NOTE — PHYSICAL EXAM
Refills x 1 year provided for patients LPP [] : deformity [2nd] : 2nd [3rd] : 3rd [DIP Joint] : DIP joint [Left] : left hand [Degenerative change] : Degenerative change [FreeTextEntry3] : mallet deformity and subcutaneous gouty appearance [FreeTextEntry1] : advanced diffuse OA > DIP's

## 2022-07-22 NOTE — HISTORY OF PRESENT ILLNESS
[Gradual] : gradual [0] : 0 [de-identified] : 89 year old RHD F with pain and swelling LEFT IF and MF x 3 months. No injury. Swelling has improved since onset. Known hx of OA. She is here with her aide.  [] : no [FreeTextEntry1] : LT hand [FreeTextEntry5] : Pt has masses on the finger tips of the Middle and Pointer finger. Pt states that the masses weep a white sticky substance. Pt has stiffness in both fingers, Pt is unable to make a fist. Pt denies pain at time of visit. Pt denies N/T and still has feeling in her finger tips.
